# Patient Record
Sex: MALE | Race: BLACK OR AFRICAN AMERICAN | Employment: FULL TIME | URBAN - METROPOLITAN AREA
[De-identification: names, ages, dates, MRNs, and addresses within clinical notes are randomized per-mention and may not be internally consistent; named-entity substitution may affect disease eponyms.]

---

## 2017-12-12 ENCOUNTER — TELEPHONE (OUTPATIENT)
Dept: SURGERY | Age: 52
End: 2017-12-12

## 2017-12-12 NOTE — TELEPHONE ENCOUNTER
Pt does not have a phone number, left message with someone that answered the phone, possible young child, to call office, not sure she understood-Yris

## 2017-12-20 ENCOUNTER — OFFICE VISIT (OUTPATIENT)
Dept: SURGERY | Age: 52
End: 2017-12-20

## 2017-12-20 VITALS
WEIGHT: 310 LBS | DIASTOLIC BLOOD PRESSURE: 84 MMHG | TEMPERATURE: 98.4 F | SYSTOLIC BLOOD PRESSURE: 132 MMHG | HEIGHT: 77 IN | RESPIRATION RATE: 16 BRPM | BODY MASS INDEX: 36.6 KG/M2 | HEART RATE: 67 BPM

## 2017-12-20 DIAGNOSIS — E55.9 HYPOVITAMINOSIS D: ICD-10-CM

## 2017-12-20 DIAGNOSIS — Z98.84 GASTRIC BYPASS STATUS FOR OBESITY: ICD-10-CM

## 2017-12-20 DIAGNOSIS — K91.2 POST-RESECTION MALABSORPTION: Primary | ICD-10-CM

## 2017-12-20 NOTE — PROGRESS NOTES
Hollie Latham is a 46 y.o. male is now 2 years status post laparoscopic gastric bypass surgery. Doing well overall. Currently on a solid meats/veggies/CHO diet without difficulty. Taking in 80+oz water,  60+g protein. He walks before his shift as , but only a few minutes/day. Bowel movements are regular. The patient is not having any pain. Sandra Frank He is compliant with multivitamins, calcium, Vit D and B12 supplements. He is pleased with his weight stability overall, but he desires reaching her goal weight of 275-80 pounds      Weight Loss Metrics 12/20/2017 12/28/2016 6/29/2016 12/23/2015 12/23/2015 12/2/2015 11/18/2015   Pre op / Initial Wt - - - 432 - - 432   Today's Wt 310 lb 314 lb 328 lb - 376 lb 409 lb 2 oz -   BMI 36.76 kg/m2 37.23 kg/m2 38.89 kg/m2 - 44.58 kg/m2 48.51 kg/m2 -   Ideal Body Wt - - - 179 - - 179   Excess Body Wt - - - 253 - - 253   Goal Wt - - - 230 - - 230   Wt loss to date - - - 56 - - 0   % Wt Loss - - - 0.28 - - 0   80% EBW - - - 202.4 - - 202.4       Body mass index is 36.76 kg/(m^2).       Comorbidities:    Hypertension: unchanged  Diabetes: resolved  Obstructive Sleep Apnea: resolved  Hyperlipidemia: not applicable  Stress Urinary Incontinence: not applicable  Gastroesophageal Reflux: not applicable  Weight related arthropathy:not applicable        Past Medical History:   Diagnosis Date    Arthritis     Chronic pain     both legs    Diabetes ([address]     Headache     Hypertension     Sleep apnea     cpap    Wound of ankle     treatment of left ankle wound       Past Surgical History:   Procedure Laterality Date    HX ORTHOPAEDIC      left knee replacement left ankle reconstruction    LAP GASTR BYPASS INCL SMLL INT RECON  12/02/2015    Dr. Kaylynn Mckeon Bilateral 2013    vein stripping    VASCULAR SURGERY PROCEDURE UNLIST      left leg ankle vein 12-14-17       Current Outpatient Prescriptions   Medication Sig Dispense Refill  losartan (COZAAR) 100 mg tablet Take 100 mg by mouth daily.  Calcium-Cholecalciferol, D3, 600 mg(1,500mg) -400 unit chew Take  by mouth.  FERROUS SULFATE, DRIED (IRON, DRIED, PO) Take  by mouth.  multivitamin (ONE A DAY) tablet Take 1 Tab by mouth daily.  cyanocobalamin 500 mcg/spray spry by Nasal route.  allopurinol (ZYLOPRIM) 300 mg tablet Take 300 mg by mouth daily.  metoprolol (LOPRESSOR) 50 mg tablet Take 50 mg by mouth nightly.  amLODIPine (NORVASC) 10 mg tablet       ascorbic acid (VITAMIN C) 500 mg tablet Take  by mouth.  ferrous sulfate (IRON) 325 mg (65 mg iron) tablet Take  by mouth Daily (before breakfast).  cholecalciferol, vitamin D3, (VITAMIN D3) 2,000 unit tab Take  by mouth. No Known Allergies    ROS:  Review of Systems   All other systems reviewed and are negative. Physicial Exam:  Visit Vitals    /84    Pulse 67    Temp 98.4 °F (36.9 °C)    Resp 16    Ht 6' 5\" (1.956 m)    Wt 140.6 kg (310 lb)    BMI 36.76 kg/m2     Physical Exam   Constitutional: He is oriented to person, place, and time and well-developed, well-nourished, and in no distress. Pulmonary/Chest: Effort normal.   Abdominal: Soft. He exhibits no distension and no mass. There is no tenderness. There is no rebound and no guarding. Musculoskeletal: Normal range of motion. Neurological: He is alert and oriented to person, place, and time. Gait normal.   Skin: Skin is warm and dry. No rash noted. No erythema. No pallor. Psychiatric: Mood, memory, affect and judgment normal.       Labs: 12/6/17: wnl    Assessment/Plan: Pt is currently 2 years s/p laparoscopic gastric bypass surgery with a total weight loss of 120lbs to date, doing well overall. If desires further weight loss, advised patient eliminate carbohydrates from his diet.   Labs were reviewed and pt was instructed to cont all supplements  Stressed importance of hydration with SF clear liquids until urine clear. Change diet to food content mainly meats/veggies. Encouraged support group attendance-gave 2018 schedule  Advised exercise program of 150 minutes/wk as able  Counseling>50% of 30 minute visit    F/u 1 year, labs prior, sooner as needed.   [facility], [place]

## 2017-12-27 DIAGNOSIS — K91.2 POSTSURGICAL MALABSORPTION: ICD-10-CM

## 2018-01-30 RX ORDER — CYANOCOBALAMIN 500 UG/1
SPRAY NASAL
Qty: 1.3 ML | Refills: 3 | Status: SHIPPED | OUTPATIENT
Start: 2018-01-30 | End: 2018-12-20 | Stop reason: SDUPTHER

## 2018-11-15 DIAGNOSIS — K91.2 POSTOPERATIVE MALABSORPTION: Primary | ICD-10-CM

## 2018-12-20 ENCOUNTER — OFFICE VISIT (OUTPATIENT)
Dept: SURGERY | Age: 53
End: 2018-12-20

## 2018-12-20 VITALS
DIASTOLIC BLOOD PRESSURE: 94 MMHG | SYSTOLIC BLOOD PRESSURE: 145 MMHG | HEART RATE: 60 BPM | WEIGHT: 315 LBS | HEIGHT: 77 IN | OXYGEN SATURATION: 98 % | RESPIRATION RATE: 16 BRPM | TEMPERATURE: 98.2 F | BODY MASS INDEX: 37.19 KG/M2

## 2018-12-20 DIAGNOSIS — K91.2 POST-RESECTION MALABSORPTION: ICD-10-CM

## 2018-12-20 DIAGNOSIS — Z98.84 GASTRIC BYPASS STATUS FOR OBESITY: ICD-10-CM

## 2018-12-20 DIAGNOSIS — E66.9 OBESITY (BMI 30-39.9): Primary | ICD-10-CM

## 2018-12-20 NOTE — PROGRESS NOTES
Arianna Sanches is a 48 y.o. male is now 3 years status post laparoscopic gastric bypass surgery. Doing well overall. Currently on a stage 4 diet without difficulty. Eating rice, potatoes, chips. Taking in 64 oz water,  60g protein. 15-20 min of activity 5 days a week. Bowel movements are alternating constipation and regularity. The patient is not having any pain. Eliseo Counter He is compliant with multivitamins, calcium, Vit D and B12 supplements. Also takes iron. Would like to get to 300lbs. Weight Loss Metrics 12/20/2018 12/20/2018 12/20/2017 12/28/2016 6/29/2016 12/23/2015 12/23/2015   Pre op / Initial Wt 432 - - - - 432 -   Today's Wt - 324 lb 310 lb 314 lb 328 lb - 376 lb   BMI - 38.42 kg/m2 36.76 kg/m2 37.23 kg/m2 38.89 kg/m2 - 44.58 kg/m2   Ideal Body Wt 183 - - - - 179 -   Excess Body Wt 249 - - - - 253 -   Goal Wt 233 - - - - 230 -   Wt loss to date 108 - - - - 56 -   % Wt Loss 0.54 - - - - 0.28 -   80% .2 - - - - 202.4 -       Body mass index is 38.42 kg/m².       Comorbidities:  Hypertension: unchanged  Diabetes: resolved  Obstructive Sleep Apnea: resolved  Hyperlipidemia: not applicable  Stress Urinary Incontinence: not applicable  Gastroesophageal Reflux: not applicable  Weight related arthropathy:not applicable      Past Medical History:   Diagnosis Date    Arthritis     Chronic pain     both legs    Diabetes ([address]     Headache     Hypertension     Sleep apnea     cpap    Wound of ankle     treatment of left ankle wound       Past Surgical History:   Procedure Laterality Date    HX ORTHOPAEDIC      left knee replacement left ankle reconstruction    LAP GASTR BYPASS INCL SMLL INT RECON  12/02/2015    Dr. Pietro Leach Bilateral 2013    vein stripping    VASCULAR SURGERY PROCEDURE UNLIST      left leg ankle vein 12-14-17       Current Outpatient Medications   Medication Sig Dispense Refill    NASCOBAL 500 mcg/spray spry USE 1 SPRAY IN ONE NOSTRIL ONCE A WEEK AS DIRECTED 1.3 mL 3    losartan (COZAAR) 100 mg tablet Take 100 mg by mouth daily.  Calcium-Cholecalciferol, D3, 600 mg(1,500mg) -400 unit chew Take  by mouth.  FERROUS SULFATE, DRIED (IRON, DRIED, PO) Take  by mouth.  multivitamin (ONE A DAY) tablet Take 1 Tab by mouth daily.  allopurinol (ZYLOPRIM) 300 mg tablet Take 300 mg by mouth daily.  metoprolol (LOPRESSOR) 50 mg tablet Take 50 mg by mouth nightly.  amLODIPine (NORVASC) 10 mg tablet       ascorbic acid (VITAMIN C) 500 mg tablet Take  by mouth.  ferrous sulfate (IRON) 325 mg (65 mg iron) tablet Take  by mouth Daily (before breakfast).  cholecalciferol, vitamin D3, (VITAMIN D3) 2,000 unit tab Take  by mouth. No Known Allergies    ROS:  Review of Systems   Respiratory:        Nasal congestion   All other systems reviewed and are negative. Physicial Exam:  Visit Vitals  BP (!) 145/94   Pulse 60   Temp 98.2 °F (36.8 °C) (Oral)   Resp 16   Ht 6' 5\" (1.956 m)   Wt 147 kg (324 lb)   SpO2 98%   BMI 38.42 kg/m²     Physical Exam   Constitutional: He is oriented to person, place, and time and well-developed, well-nourished, and in no distress. HENT:   Head: Normocephalic. Cardiovascular: Normal rate. Pulmonary/Chest: Effort normal.   Abdominal: Soft. Bowel sounds are normal.   Musculoskeletal: Normal range of motion. Neurological: He is alert and oriented to person, place, and time. Skin: Skin is warm and dry. Psychiatric: Affect normal.       Labs: pending   Assessment/Plan: Pt is currently 3 yr s/p laparoscopic gastric bypass surgery with a total weight loss of 108 lbs to date, doing well but desires to lose more weight. If desires further weight loss, advised patient eliminate carbohydrates from his diet. Ref to RD. Labs pending. Stressed importance of hydration with SF clear liquids until urine clear. Continue with food content mainly meats/veggies. Food list given. Encouraged support group attendance. Advised exercise program of 150m/week.         >50% of 30 min visit spent counseling     Shanice Mosher, FNP-BC

## 2018-12-20 NOTE — PROGRESS NOTES
Pt ID confirmed    Weight Loss Metrics 12/20/2018 12/20/2018 12/20/2017 12/28/2016 6/29/2016 12/23/2015 12/23/2015   Pre op / Initial Wt 432 - - - - 432 -   Today's Wt - 324 lb 310 lb 314 lb 328 lb - 376 lb   BMI - 38.42 kg/m2 36.76 kg/m2 37.23 kg/m2 38.89 kg/m2 - 44.58 kg/m2   Ideal Body Wt 183 - - - - 179 -   Excess Body Wt 249 - - - - 253 -   Goal Wt 233 - - - - 230 -   Wt loss to date 108 - - - - 56 -   % Wt Loss 0.54 - - - - 0.28 -   80% .2 - - - - 202.4 -       Body mass index is 38.42 kg/m². Post op medications:  MVI: x2day  Calcium Citrate: 1500mg  Actigal na  B-12 1000microgram  Vit D 3 5000units    Chief Complaint   Patient presents with    Follow-up     GBP year follow up     1. Have you been to the ER, urgent care clinic since your last visit? Hospitalized since your last visit? No    2. Have you seen or consulted any other health care providers outside of the [address] [place] [address] since your last visit? Include any pap smears or colon screening. No     [place] Emelyn has been given the following recommendations today due to his elevated BP reading: please follow up with pcp regarding elevated bp.

## 2019-12-13 ENCOUNTER — TELEPHONE (OUTPATIENT)
Dept: SURGERY | Age: 54
End: 2019-12-13

## 2019-12-13 DIAGNOSIS — K91.2 POST-RESECTION MALABSORPTION: Primary | ICD-10-CM

## 2019-12-13 NOTE — TELEPHONE ENCOUNTER
Called pt for reminder of upcoming appointment and to get labs. Pt stated he would need to reschedule due to a class he was taking and to please fax lab orders to [facility] where he gets his labs done. Labs faxed to [facility] (-) Lab at fax # [phone]. Pt rescheduled appointment for 12/26/2019 at 0930.

## 2019-12-26 ENCOUNTER — OFFICE VISIT (OUTPATIENT)
Dept: SURGERY | Age: 54
End: 2019-12-26

## 2019-12-26 VITALS
TEMPERATURE: 97.9 F | DIASTOLIC BLOOD PRESSURE: 80 MMHG | HEART RATE: 80 BPM | HEIGHT: 77 IN | SYSTOLIC BLOOD PRESSURE: 128 MMHG | OXYGEN SATURATION: 98 % | WEIGHT: 315 LBS | RESPIRATION RATE: 16 BRPM | BODY MASS INDEX: 37.19 KG/M2

## 2019-12-26 DIAGNOSIS — I10 ESSENTIAL HYPERTENSION: ICD-10-CM

## 2019-12-26 DIAGNOSIS — Z98.84 HISTORY OF ROUX-EN-Y GASTRIC BYPASS: ICD-10-CM

## 2019-12-26 DIAGNOSIS — K91.2 POST-RESECTION MALABSORPTION: Primary | ICD-10-CM

## 2019-12-26 NOTE — PROGRESS NOTES
Florentino Ortiz is a 47 y.o. male is now 4 years status post laparoscopic gastric bypass surgery. Doing well overall. Currently on a bariatric stage 5 diet without difficulty. Taking in > 64 oz water, > 30 g protein. 15 min of activity 2-3 days a week. Bowel movements are alternating constipation and regularity. The patient is not having any pain. Melvenia Dmitriy He is compliant with multivitamins, calcium, Vit D and B1 & 12 supplements. Patient denies use of NSAIDs, ETOH, or use of straws. Patient does reports he drinks sodas twice a week and sometimes sweet tea. Weight Loss Metrics 12/26/2019 12/26/2019 12/20/2018 12/20/2018 12/20/2017 12/28/2016 6/29/2016   Pre op / Initial Wt 432 - 432 - - - -   Today's Wt - 325 lb - 324 lb 310 lb 314 lb 328 lb   BMI - 38.54 kg/m2 - 38.42 kg/m2 36.76 kg/m2 37.23 kg/m2 38.89 kg/m2   Ideal Body Wt 183 - 183 - - - -   Excess Body Wt 249 - 249 - - - -   Goal Wt 233 - 233 - - - -   Wt loss to date - - 108 - - - -   % Wt Loss - - 0.54 - - - -   80% .2 - 199.2 - - - -       Body mass index is 38.54 kg/m².       Comorbidities:    Hypertension: unchanged  Diabetes: not applicable  Obstructive Sleep Apnea: unchanged  Hyperlipidemia: not applicable  Stress Urinary Incontinence: not applicable  Gastroesophageal Reflux: not applicable  Weight related arthropathy:improved        Past Medical History:   Diagnosis Date    Arthritis     Chronic pain     both legs    Diabetes ([address]     Headache     Hypertension     Sleep apnea     cpap    Wound of ankle     treatment of left ankle wound       Past Surgical History:   Procedure Laterality Date    HX ORTHOPAEDIC      left knee replacement left ankle reconstruction    LAP GASTR BYPASS INCL SMLL INT RECON  12/02/2015    Dr. Fabian Shannon Bilateral 2013    vein stripping    VASCULAR SURGERY PROCEDURE UNLIST      left leg ankle vein 12-14-17       Current Outpatient Medications   Medication Sig Dispense Refill    cyanocobalamin (NASCOBAL) 500 mcg/spray spry 500 mcg by Nasal route every seven (7) days. Use one spray in one nostril once a week as directed 2.3 mL 5    cyanocobalamin (NASCOBAL) 500 mcg/spray spry 1 [place] by Nasal route every seven (7) days. 5.2 mL 3    losartan (COZAAR) 100 mg tablet Take 100 mg by mouth daily.  Calcium-Cholecalciferol, D3, 600 mg(1,500mg) -400 unit chew Take  by mouth.  FERROUS SULFATE, DRIED (IRON, DRIED, PO) Take  by mouth.  multivitamin (ONE A DAY) tablet Take 1 Tab by mouth daily.  allopurinol (ZYLOPRIM) 300 mg tablet Take 300 mg by mouth daily.  metoprolol (LOPRESSOR) 50 mg tablet Take 50 mg by mouth nightly.  amLODIPine (NORVASC) 10 mg tablet       ascorbic acid (VITAMIN C) 500 mg tablet Take  by mouth.  ferrous sulfate (IRON) 325 mg (65 mg iron) tablet Take  by mouth Daily (before breakfast).  cholecalciferol, vitamin D3, (VITAMIN D3) 2,000 unit tab Take  by mouth.          No Known Allergies    ROS:  Positive in BOLD  CONST: Fever, weight loss, fatigue or chills  GI: Nausea, vomiting, abdominal pain, change in bowel habits, hematochezia, melena, and GERD   INTEG: Dermatitis, abnormal moles  HEENT: Recent changes in vision, vertigo, epistaxis, dysphagia and hoarseness  CV: Chest pain, palpitations, HTN, edema and varicosities  RESP: Cough, shortness of breath, wheezing, hemoptysis, snoring and reactive airway disease  : Hematuria, dysuria, frequency, urgency, nocturia and stress urinary incontinence   MS: Weakness, joint pain and arthritis  ENDO: Diabetes, thyroid disease, polyuria, polydipsia, polyphagia, poor wound healing, heat intolerance, cold intolerance  LYMPH/HEME: Anemia, bruising and history of blood transfusions  NEURO: Dizziness, headache, fainting, seizures and stroke  PSYCH: Anxiety and depression      Physicial Exam:  Visit Vitals  /80   Pulse 80   Temp 97.9 °F (36.6 °C) (Oral)   Resp 16   Ht 6' 5\" (1.956 m)   Wt 147.4 kg (325 lb)   SpO2 98%   BMI 38.54 kg/m²     General: 47 y.o.) male in no acute distress. HEENT: Normocephalic, atraumatic, Pupils equal and reactive, nasopharynx clear, oropharynx clear and moist without lesions  NECK: Supple, no lymphadenopathy, thyromegaly, carotid bruits or jugular venous distension. trachea midline  RESP: Clear to auscultation bilaterally, no wheezes, rhonchi, or rales, normal respiratory excursion  CV: Regular rate and rhythm, no murmurs, rubs or gallops. 3+/4 pulses in bilateral dorsalis pedis and posterior tibialis. No distal edema or varicosities. ABD: Soft, nontender, nondistended, normoactive bowel sounds, no hernias, no hepatosplenomegaly  Extremities: Warm, well perfused, no tenderness or swelling, normal gait/station  Neuro: Sensation and strength grossly intact and symmetrical  Psych: Alert and oriented to person, place, and time. Labs: Reviewed     Assessment/Plan: Pt is currently 4 years s/p laparoscopic gastric bypass surgery with a total weight loss of 107 lbs to date, doing well  Labs were reviewed with patient. Patient was instructed to continue multivitamins, calcium, Vit D and B1 & 12 supplements  Stressed importance of hydration with SF clear liquids until urine clear. OK to advance diet as directed in bariatric manual with food content mainly meats/veggies. Encouraged support group attendance  Advised exercise program of 30 minutes daily   Strongly advised patient to stop drinking sodas and sweet tea           >50% of 25 minute visit spent face to face time with Trevin Reece consisted of counseling & coordinating and/or discussing treatment plans in reference to his The encounter diagnosis was Post-resection malabsorption.         Saba Bragg, HAYLEEP-BC

## 2019-12-26 NOTE — PROGRESS NOTES
Chief Complaint   Patient presents with    Follow-up     [facility] 12/2015     Pt ID confirmed    Weight Loss Metrics 12/26/2019 12/26/2019 12/20/2018 12/20/2018 12/20/2017 12/28/2016 6/29/2016   Pre op / Initial Wt 432 - 432 - - - -   Today's Wt - 325 lb - 324 lb 310 lb 314 lb 328 lb   BMI - 38.54 kg/m2 - 38.42 kg/m2 36.76 kg/m2 37.23 kg/m2 38.89 kg/m2   Ideal Body Wt 183 - 183 - - - -   Excess Body Wt 249 - 249 - - - -   Goal Wt - - 233 - - - -   Wt loss to date - - 108 - - - -   % Wt Loss - - 0.54 - - - -   80% .2 - 199.2 - - - -       Body mass index is 38.54 kg/m².     Post op medications:  MVI: x2day  Calcium Citrate: 1500mg    B-12 1000mcg  Vit D 3 5000units

## 2020-01-09 ENCOUNTER — TELEPHONE (OUTPATIENT)
Dept: SURGERY | Age: 55
End: 2020-01-09

## 2020-01-09 NOTE — TELEPHONE ENCOUNTER
Attempted to call patient per NP Carmenza Johansen regarding lab results. Left message for patient to return call. Also called mobile number and the mailbox was full.

## 2020-12-11 DIAGNOSIS — K91.2 POSTOPERATIVE MALABSORPTION: Primary | ICD-10-CM

## 2020-12-18 ENCOUNTER — OFFICE VISIT (OUTPATIENT)
Dept: SURGERY | Age: 55
End: 2020-12-18
Payer: COMMERCIAL

## 2020-12-18 VITALS
HEART RATE: 63 BPM | RESPIRATION RATE: 20 BRPM | OXYGEN SATURATION: 98 % | SYSTOLIC BLOOD PRESSURE: 160 MMHG | TEMPERATURE: 97.7 F | HEIGHT: 77 IN | WEIGHT: 315 LBS | BODY MASS INDEX: 37.19 KG/M2 | DIASTOLIC BLOOD PRESSURE: 90 MMHG

## 2020-12-18 DIAGNOSIS — R63.5 WEIGHT GAIN STATUS POST GASTRIC BYPASS: ICD-10-CM

## 2020-12-18 DIAGNOSIS — Z98.84 HISTORY OF ROUX-EN-Y GASTRIC BYPASS: Primary | ICD-10-CM

## 2020-12-18 DIAGNOSIS — K91.2 POST-RESECTION MALABSORPTION: ICD-10-CM

## 2020-12-18 DIAGNOSIS — Z98.84 WEIGHT GAIN STATUS POST GASTRIC BYPASS: ICD-10-CM

## 2020-12-18 DIAGNOSIS — Z98.84 GASTRIC BYPASS STATUS FOR OBESITY: ICD-10-CM

## 2020-12-18 PROCEDURE — 99214 OFFICE O/P EST MOD 30 MIN: CPT | Performed by: NURSE PRACTITIONER

## 2020-12-18 RX ORDER — HYDROCHLOROTHIAZIDE 25 MG/1
25 TABLET ORAL DAILY
COMMUNITY
Start: 2020-09-21

## 2020-12-18 RX ORDER — ERGOCALCIFEROL 1.25 MG/1
50000 CAPSULE ORAL
COMMUNITY

## 2020-12-18 RX ORDER — PENTOXIFYLLINE 400 MG/1
400 TABLET, EXTENDED RELEASE ORAL
COMMUNITY
Start: 2020-12-10 | End: 2021-12-22

## 2020-12-18 RX ORDER — CYANOCOBALAMIN 500 UG/1
1 SPRAY NASAL
Qty: 5.2 ML | Refills: 12 | Status: SHIPPED | OUTPATIENT
Start: 2020-12-18 | End: 2022-01-10 | Stop reason: SDUPTHER

## 2020-12-18 NOTE — PROGRESS NOTES
Pt ID confirmed    Weight Loss Metrics 12/18/2020 12/18/2020 12/26/2019 12/26/2019 12/20/2018 12/20/2018 12/20/2017   Pre op / Initial Wt 432 - 432 - 432 - -   Today's Wt - 344 lb 8 oz - 325 lb - 324 lb 310 lb   BMI - 40.85 kg/m2 - 38.54 kg/m2 - 38.42 kg/m2 36.76 kg/m2   Ideal Body Wt 183 - 183 - 183 - -   Excess Body Wt 249 - 249 - 249 - -   Goal Wt 233 - 233 - 233 - -   Wt loss to date 87.5 - - - 108 - -   % Wt Loss 0.44 - - - 0.54 - -   80% .2 - 199.2 - 199.2 - -       Body mass index is 40.85 kg/m².     Post op medications:  MVI: bid  Calcium Citrate: 1500  Mg daily  Actigal: 0  B-12: need refill of Nascobal  Vitamin D3: 5,000 units daily

## 2020-12-18 NOTE — PROGRESS NOTES
3       Bariatric Postoperative Progress Note    Gm Muro is a 54 y.o. male is now 5 years status post laparoscopic gastric bypass surgery. Currently on a stage 4 diet without difficulty. Taking in 64oz water,  60g protein. 0min of activity 7 days a week. Bowel movements are regular. The patient is not having any pain. Rawleigh Billing He is compliant with multivitamins, calcium, Vit D and B12 supplements. Out of B12 recently, requesting refills today. Weight Loss Metrics 12/18/2020 12/18/2020 12/26/2019 12/26/2019 12/20/2018 12/20/2018 12/20/2017   Pre op / Initial Wt 432 - 432 - 432 - -   Today's Wt - 344 lb 8 oz - 325 lb - 324 lb 310 lb   BMI - 40.85 kg/m2 - 38.54 kg/m2 - 38.42 kg/m2 36.76 kg/m2   Ideal Body Wt 183 - 183 - 183 - -   Excess Body Wt 249 - 249 - 249 - -   Goal Wt 233 - 233 - 233 - -   Wt loss to date 87.5 - - - 108 - -   % Wt Loss 0.44 - - - 0.54 - -   80% .2 - 199.2 - 199.2 - -     Comorbidities:  Hypertension: unchanged  Diabetes: resolved  Obstructive Sleep Apnea: resolved  Hyperlipidemia: not applicable  Stress Urinary Incontinence: not applicable  Gastroesophageal Reflux: not applicable  Weight related arthropathy:not applicable        Past Medical History:   Diagnosis Date    Arthritis     Chronic pain     both legs    Diabetes ([address]     Headache     Hypertension     Sleep apnea     cpap    Wound of ankle     treatment of left ankle wound       Past Surgical History:   Procedure Laterality Date    HX ORTHOPAEDIC      left knee replacement left ankle reconstruction    LAP GASTR BYPASS INCL SMLL INT RECON  12/02/2015    Dr. Persaud Primer Bilateral 2013    vein stripping    VASCULAR SURGERY PROCEDURE UNLIST      left leg ankle vein 12-14-17       Current Outpatient Medications   Medication Sig Dispense Refill    ergocalciferol (ERGOCALCIFEROL) 1,250 mcg (50,000 unit) capsule Take 50,000 Units by mouth.       pentoxifylline CR (TRENTAL) 400 mg CR tablet 400 mg three (3) times daily (with meals).  hydroCHLOROthiazide (HYDRODIURIL) 25 mg tablet 25 mg daily.  cyanocobalamin (NASCOBAL) 500 mcg/spray spry 500 mcg by Nasal route every seven (7) days. Use one spray in one nostril once a week as directed 2.3 mL 5    cyanocobalamin (NASCOBAL) 500 mcg/spray spry 1 [place] by Nasal route every seven (7) days. 5.2 mL 3    losartan (COZAAR) 100 mg tablet Take 100 mg by mouth daily. 1/2 tab daily      Calcium-Cholecalciferol, D3, 600 mg(1,500mg) -400 unit chew Take  by mouth.  FERROUS SULFATE, DRIED (IRON, DRIED, PO) Take  by mouth.  multivitamin (ONE A DAY) tablet Take 1 Tab by mouth daily.  allopurinol (ZYLOPRIM) 300 mg tablet Take 300 mg by mouth daily.  metoprolol (LOPRESSOR) 50 mg tablet Take 50 mg by mouth nightly.  amLODIPine (NORVASC) 10 mg tablet       ascorbic acid (VITAMIN C) 500 mg tablet Take  by mouth.  ferrous sulfate (IRON) 325 mg (65 mg iron) tablet Take  by mouth Daily (before breakfast).  cholecalciferol, vitamin D3, (VITAMIN D3) 2,000 unit tab Take  by mouth. No Known Allergies    ROS:  Review of Systems   Constitutional:        Weight gain    All other systems reviewed and are negative. Physicial Exam:  Visit Vitals  BP (!) 160/90   Pulse 63   Temp 97.7 °F (36.5 °C) (Temporal)   Resp 20   Ht 6' 5\" (1.956 m)   Wt 156.3 kg (344 lb 8 oz)   SpO2 98%   BMI 40.85 kg/m²     Physical Exam  Vitals signs and nursing note reviewed. HENT:      Head: Normocephalic and atraumatic. Cardiovascular:      Rate and Rhythm: Normal rate. Heart sounds: Normal heart sounds. Pulmonary:      Effort: Pulmonary effort is normal.      Breath sounds: Normal breath sounds. Abdominal:      General: Bowel sounds are normal. There is no distension. Palpations: Abdomen is soft. Tenderness: There is no abdominal tenderness. Musculoskeletal: Normal range of motion.    Skin: General: Skin is warm and dry. Neurological:      General: No focal deficit present. Mental Status: He is alert. Labs:   No results found for this or any previous visit (from the past 672 hour(s)). Assessment/Plan: Today, Maryjane Chavarria is  Height: 6' 5\" (195.6 cm) tall, Weight: 156.3 kg (344 lb 8 oz) lbs for a Body mass index is 40.85 kg/m². and are suffering from morbid obesity . They are currently 5 years s/p laparoscopic gastric bypass surgery with a total weight loss of 87.5lbs to date, doing well but gaining weight. Labs were ordered and pt was instructed to have preformed. We have reviewed the components of a successful postoperative course including requirement for a high protein, low carbohydrate diet, 64 oz a day of zero calorie liquids, daily vitamin supplementation, daily exercise (150 mis/week), regular follow-up, and participation in support groups. Refer to registered dietitian for more detailed medical nutrition therapy. The primary encounter diagnosis was History of Tila-en-Y gastric bypass. Diagnoses of Post-resection malabsorption, Gastric bypass status for obesity, Weight gain status post gastric bypass, and BMI 40.0-44.9, adult ([address] were also pertinent to this visit. Follow up RD, weight check and yearly with labs, sooner as needed.   Vidal Joseph NP

## 2021-01-12 ENCOUNTER — DOCUMENTATION ONLY (OUTPATIENT)
Dept: OTHER | Age: 56
End: 2021-01-12

## 2021-01-12 NOTE — PROGRESS NOTES
1/12/2021: I have attempted to call patient to schedule a nutrition follow up per Kayla Fernandez, but his mail box is full.     Sueann Skiff, MS RD

## 2021-01-13 ENCOUNTER — DOCUMENTATION ONLY (OUTPATIENT)
Dept: BARIATRICS/WEIGHT MGMT | Age: 56
End: 2021-01-13

## 2021-01-13 NOTE — PROGRESS NOTES
1/13/2021:  Patient was contacted to schedule a nutrition follow up. 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     Yany Everett MS RD

## 2021-01-14 ENCOUNTER — TELEPHONE (OUTPATIENT)
Dept: SURGERY | Age: 56
End: 2021-01-14

## 2021-01-14 NOTE — TELEPHONE ENCOUNTER
PT called and and stated his pharmacy has not received the electronic script for his B-12 spray from his mail order pharmacy that was ordered at his last appt on 12/18/2020. I looked in his record and it had been ordered. I called his pharmacy and they stated they did not receive it but did send out a request for a refill. The pharmacy took a verbal over the phone which was given exactly how it was ordered by the provider on 12/18/2020. It will be processed and sent to patient. Pt was notified that script would be shipped as soon as processed.

## 2021-05-31 ENCOUNTER — HOSPITAL ENCOUNTER (EMERGENCY)
Age: 56
Discharge: HOME | End: 2021-05-31
Attending: INTERNAL MEDICINE
Payer: COMMERCIAL

## 2021-05-31 VITALS
TEMPERATURE: 98.3 F | HEART RATE: 85 BPM | DIASTOLIC BLOOD PRESSURE: 80 MMHG | SYSTOLIC BLOOD PRESSURE: 125 MMHG | BODY MASS INDEX: 36.6 KG/M2 | HEIGHT: 77 IN | WEIGHT: 310 LBS | OXYGEN SATURATION: 97 % | RESPIRATION RATE: 18 BRPM

## 2021-05-31 VITALS
BODY MASS INDEX: 36.6 KG/M2 | HEART RATE: 70 BPM | OXYGEN SATURATION: 98 % | SYSTOLIC BLOOD PRESSURE: 182 MMHG | TEMPERATURE: 98.5 F | WEIGHT: 310 LBS | DIASTOLIC BLOOD PRESSURE: 98 MMHG | HEIGHT: 77 IN | RESPIRATION RATE: 18 BRPM

## 2021-05-31 DIAGNOSIS — R04.0 ANTERIOR EPISTAXIS: Primary | ICD-10-CM

## 2021-05-31 DIAGNOSIS — R04.0 RIGHT-SIDED EPISTAXIS: Primary | ICD-10-CM

## 2021-05-31 DIAGNOSIS — I10 ESSENTIAL HYPERTENSION: ICD-10-CM

## 2021-05-31 LAB
ANION GAP SERPL CALC-SCNC: 8 MMOL/L
BASOPHILS # BLD: 0 K/UL (ref 0–0.1)
BASOPHILS NFR BLD: 1 % (ref 0–2)
BUN SERPL-MCNC: 24 MG/DL (ref 9–21)
BUN/CREAT SERPL: 24
CA-I BLD-MCNC: 8.7 MG/DL (ref 8.5–10.5)
CHLORIDE SERPL-SCNC: 106 MMOL/L (ref 94–111)
CO2 SERPL-SCNC: 25 MMOL/L (ref 21–33)
CREAT SERPL-MCNC: 1 MG/DL (ref 0.8–1.5)
EOSINOPHIL # BLD: 0.1 K/UL (ref 0–0.4)
EOSINOPHIL NFR BLD: 2 % (ref 0–5)
ERYTHROCYTE [DISTWIDTH] IN BLOOD BY AUTOMATED COUNT: 15.8 % (ref 11.6–14.5)
GLUCOSE SERPL-MCNC: 136 MG/DL (ref 70–110)
HCT VFR BLD AUTO: 33.5 % (ref 36–48)
HGB BLD-MCNC: 10.5 G/DL (ref 13–16)
IMM GRANULOCYTES # BLD AUTO: 0 K/UL
IMM GRANULOCYTES NFR BLD AUTO: 0 %
INR PPP: 1.1 (ref 0.8–1.2)
LYMPHOCYTES # BLD: 0.9 K/UL (ref 0.9–3.6)
LYMPHOCYTES NFR BLD: 23 % (ref 21–52)
MCH RBC QN AUTO: 27.9 PG (ref 24–34)
MCHC RBC AUTO-ENTMCNC: 31.3 G/DL (ref 31–37)
MCV RBC AUTO: 88.9 FL (ref 74–97)
MONOCYTES # BLD: 0.3 K/UL (ref 0.05–1.2)
MONOCYTES NFR BLD: 8 % (ref 3–10)
NEUTS SEG # BLD: 2.6 K/UL (ref 1.8–8)
NEUTS SEG NFR BLD: 66 % (ref 40–73)
PLATELET # BLD AUTO: 234 K/UL (ref 135–420)
PMV BLD AUTO: 10 FL (ref 9.2–11.8)
POTASSIUM SERPL-SCNC: 3.4 MMOL/L (ref 3.2–5.1)
PROTHROMBIN TIME: 13.2 SEC (ref 11.5–15.2)
RBC # BLD AUTO: 3.77 M/UL (ref 4.7–5.5)
SODIUM SERPL-SCNC: 139 MMOL/L (ref 135–145)
WBC # BLD AUTO: 4 K/UL (ref 4.6–13.2)

## 2021-05-31 PROCEDURE — 99285 EMERGENCY DEPT VISIT HI MDM: CPT

## 2021-05-31 PROCEDURE — 80048 BASIC METABOLIC PNL TOTAL CA: CPT

## 2021-05-31 PROCEDURE — 74011250637 HC RX REV CODE- 250/637: Performed by: INTERNAL MEDICINE

## 2021-05-31 PROCEDURE — 99283 EMERGENCY DEPT VISIT LOW MDM: CPT

## 2021-05-31 PROCEDURE — 74011000250 HC RX REV CODE- 250: Performed by: INTERNAL MEDICINE

## 2021-05-31 PROCEDURE — 85025 COMPLETE CBC W/AUTO DIFF WBC: CPT

## 2021-05-31 PROCEDURE — 36415 COLL VENOUS BLD VENIPUNCTURE: CPT

## 2021-05-31 PROCEDURE — 85610 PROTHROMBIN TIME: CPT

## 2021-05-31 PROCEDURE — 74011250636 HC RX REV CODE- 250/636: Performed by: INTERNAL MEDICINE

## 2021-05-31 RX ORDER — CEPHALEXIN 500 MG/1
500 CAPSULE ORAL 2 TIMES DAILY
Qty: 6 CAPSULE | Refills: 0 | Status: SHIPPED | OUTPATIENT
Start: 2021-05-31 | End: 2021-06-03

## 2021-05-31 RX ORDER — OXYMETAZOLINE HCL 0.05 %
2 SPRAY, NON-AEROSOL (ML) NASAL
Status: COMPLETED | OUTPATIENT
Start: 2021-05-31 | End: 2021-05-31

## 2021-05-31 RX ORDER — SILVER NITRATE 38.21; 12.74 MG/1; MG/1
1 STICK TOPICAL
Status: DISCONTINUED | OUTPATIENT
Start: 2021-05-31 | End: 2021-05-31

## 2021-05-31 RX ORDER — HYDRALAZINE HYDROCHLORIDE 20 MG/ML
5 INJECTION INTRAMUSCULAR; INTRAVENOUS ONCE
Status: DISCONTINUED | OUTPATIENT
Start: 2021-05-31 | End: 2021-05-31

## 2021-05-31 RX ADMIN — SODIUM CHLORIDE 1000 ML: 9 INJECTION, SOLUTION INTRAVENOUS at 02:58

## 2021-05-31 RX ADMIN — OXYMETAZOLINE HCL 2 SPRAY: 0.05 SPRAY NASAL at 01:40

## 2021-05-31 RX ADMIN — BENZOCAINE: 200 SPRAY DENTAL; ORAL; PERIODONTAL at 01:40

## 2021-05-31 RX ADMIN — OXYMETAZOLINE HCL 2 SPRAY: 0.05 SPRAY NASAL at 15:53

## 2021-05-31 NOTE — ED NOTES
Dr. Gisella Glass in to place Epistax packing to Right nostril to control bleeding. Will continue to monitor.

## 2021-05-31 NOTE — ED PROVIDER NOTES
EMERGENCY DEPARTMENT HISTORY AND PHYSICAL EXAM      Date: 5/31/2021  Patient Name: Devon Covington    History of Presenting Illness     Chief Complaint   Patient presents with    Epistaxis       History Provided By: Patient    HPI: Devon Covington, 64 y.o. male with a past medical history significant diabetes, hypertension and chronic left ankle wound, obstructive sleep apnea presents to the ED with cc of right sided epistaxis. This has been going on intermittently since earlier today. He went to [facility] ER last evening, and since the bleeding had stopped no intervention was required. After going home he started to have brisk bleeding from the right nare. Ice and pressure were applied continuously for at least 10 minutes but this did not stop the bleeding. Patient denies picking, trauma, steroid or other nasal sprays, snorting cocaine, or any other potential aggravators. He is not on anticoagulation or NSAIDs. There are no other complaints, changes, or physical findings at this time. PCP: Angela Becerra MD    No current facility-administered medications on file prior to encounter. Current Outpatient Medications on File Prior to Encounter   Medication Sig Dispense Refill    oxymetazoline HCl (AFRIN NASAL SPRAY NA) 2 Sprays by Nasal route two (2) times daily as needed (for nose bleeds).  ergocalciferol (ERGOCALCIFEROL) 1,250 mcg (50,000 unit) capsule Take 50,000 Units by mouth.  pentoxifylline CR (TRENTAL) 400 mg CR tablet 400 mg three (3) times daily (with meals).  hydroCHLOROthiazide (HYDRODIURIL) 25 mg tablet 25 mg daily.  cyanocobalamin (Nascobal) 500 mcg/spray spry 1 [place] by Nasal route every seven (7) days. Indications: prevention of vitamin B12 deficiency 5.2 mL 12    cyanocobalamin (NASCOBAL) 500 mcg/spray spry 500 mcg by Nasal route every seven (7) days.  Use one spray in one nostril once a week as directed 2.3 mL 5    losartan (COZAAR) 100 mg tablet Take 100 mg by mouth daily. 1/2 tab daily      Calcium-Cholecalciferol, D3, 600 mg(1,500mg) -400 unit chew Take  by mouth.  FERROUS SULFATE, DRIED (IRON, DRIED, PO) Take  by mouth.  multivitamin (ONE A DAY) tablet Take 1 Tab by mouth daily.  allopurinol (ZYLOPRIM) 300 mg tablet Take 300 mg by mouth daily.  metoprolol (LOPRESSOR) 50 mg tablet Take 50 mg by mouth nightly.  amLODIPine (NORVASC) 10 mg tablet       ascorbic acid (VITAMIN C) 500 mg tablet Take  by mouth.  ferrous sulfate (IRON) 325 mg (65 mg iron) tablet Take  by mouth Daily (before breakfast).  cholecalciferol, vitamin D3, (VITAMIN D3) 2,000 unit tab Take  by mouth. Past History     Past Medical History:  Past Medical History:   Diagnosis Date    Arthritis     Chronic pain     both legs    Diabetes ([address]     Headache     Hypertension     Sleep apnea     cpap    Wound of ankle     treatment of left ankle wound       Past Surgical History:  Past Surgical History:   Procedure Laterality Date    HX ORTHOPAEDIC      left knee replacement left ankle reconstruction    [place] LAP GASTR BYPASS INCL SMLL INT RECON  2015    Dr. Rodriguez Knee Bilateral 2013    vein stripping    VASCULAR SURGERY PROCEDURE UNLIST      left leg ankle vein 17       Family History:  Family History   Problem Relation Age of Onset    Heart Disease Father     Hypertension Mother        Social History:  Social History     Tobacco Use    Smoking status: Former Smoker     Quit date: 1972     Years since quittin.8    Smokeless tobacco: Never Used    Tobacco comment: over 40 year   Substance Use Topics    Alcohol use: No     Alcohol/week: 0.0 standard drinks    Drug use: No       Allergies:  No Known Allergies      Review of Systems     Review of Systems   Unable to perform ROS: Acuity of condition   HENT: Positive for nosebleeds.         Physical Exam     Physical Exam  Vitals and nursing note reviewed. Constitutional:       General: He is not in acute distress. Appearance: He is well-developed and normal weight. He is not toxic-appearing. HENT:      Head: Normocephalic and atraumatic. Nose:      Right Nostril: Epistaxis (unable to obtain bloodless field) present. No septal hematoma. Left Nostril: No epistaxis. Mouth/Throat:      Pharynx: No pharyngeal swelling, oropharyngeal exudate, posterior oropharyngeal erythema or uvula swelling. Tonsils: No tonsillar exudate or tonsillar abscesses. 3+ on the right. 3+ on the left. Eyes:      Extraocular Movements: Extraocular movements intact. Pupils: Pupils are equal, round, and reactive to light. Cardiovascular:      Rate and Rhythm: Normal rate and regular rhythm. Pulmonary:      Effort: Pulmonary effort is normal. No respiratory distress. Breath sounds: Normal breath sounds. No stridor. No wheezing. Chest:      Chest wall: No tenderness. Abdominal:      General: Abdomen is flat. Palpations: Abdomen is soft. Tenderness: There is no abdominal tenderness. There is no guarding. Hernia: No hernia is present. Musculoskeletal:         General: No swelling, tenderness or deformity. Normal range of motion. Cervical back: Normal range of motion and neck supple. Lymphadenopathy:      Cervical: No cervical adenopathy. Skin:     General: Skin is warm and dry. Capillary Refill: Capillary refill takes less than 2 seconds. Neurological:      General: No focal deficit present. Mental Status: He is alert and oriented to person, place, and time. Cranial Nerves: No cranial nerve deficit.    Psychiatric:         Mood and Affect: Mood normal.         Behavior: Behavior normal.         Lab and Diagnostic Study Results     Labs -     Recent Results (from the past 12 hour(s))   CBC WITH AUTOMATED DIFF    Collection Time: 05/31/21  2:00 AM   Result Value Ref Range    WBC 4.0 (L) 4.6 - 13.2 K/uL    RBC 3.77 (L) 4.70 - 5.50 M/uL    HGB 10.5 (L) 13.0 - 16.0 g/dL    HCT 33.5 (L) 36.0 - 48.0 %    MCV 88.9 74.0 - 97.0 FL    MCH 27.9 24.0 - 34.0 PG    MCHC 31.3 31.0 - 37.0 g/dL    RDW 15.8 (H) 11.6 - 14.5 %    PLATELET 235 553 - 753 K/uL    MPV 10.0 9.2 - 11.8 FL    NEUTROPHILS 66 40 - 73 %    LYMPHOCYTES 23 21 - 52 %    MONOCYTES 8 3 - 10 %    EOSINOPHILS 2 0 - 5 %    BASOPHILS 1 0 - 2 %    IMMATURE GRANULOCYTES 0 %    ABS. NEUTROPHILS 2.6 1.8 - 8.0 K/UL    ABS. LYMPHOCYTES 0.9 0.9 - 3.6 K/UL    ABS. MONOCYTES 0.3 0.05 - 1.2 K/UL    ABS. EOSINOPHILS 0.1 0.0 - 0.4 K/UL    ABS. BASOPHILS 0.0 0.0 - 0.1 K/UL    ABS. IMM. GRANS. 0.0 K/UL   METABOLIC PANEL, BASIC    Collection Time: 05/31/21  2:00 AM   Result Value Ref Range    Sodium 139 135 - 145 mmol/L    Potassium 3.4 3.2 - 5.1 mmol/L    Chloride 106 94 - 111 mmol/L    CO2 25 21 - 33 mmol/L    Anion gap 8 mmol/L    Glucose 136 (H) 70 - 110 mg/dL    BUN 24 (H) 9 - 21 mg/dL    Creatinine 1.00 0.8 - 1.50 mg/dL    BUN/Creatinine ratio 24      GFR est AA >60 ml/min/1.73m2    GFR est non-AA >60 ml/min/1.73m2    Calcium 8.7 8.5 - 10.5 mg/dL   PROTHROMBIN TIME + INR    Collection Time: 05/31/21  2:00 AM   Result Value Ref Range    Prothrombin time 13.2 11.5 - 15.2 sec    INR 1.1 0.8 - 1.2         Radiologic Studies -   @lastxrresult@  CT Results  (Last 48 hours)    None        CXR Results  (Last 48 hours)    None            Medical Decision Making   - I am the first provider for this patient. - I reviewed the vital signs, available nursing notes, past medical history, past surgical history, family history and social history. - Initial assessment performed. The patients presenting problems have been discussed, and they are in agreement with the care plan formulated and outlined with them. I have encouraged them to ask questions as they arise throughout their visit. Vital Signs-Reviewed the patient's vital signs.   Patient Vitals for the past 12 hrs: Temp Pulse Resp BP SpO2   05/31/21 0206  87 18 (!) 163/93 98 %   05/31/21 0155  92 18 (!) 156/97    05/31/21 0125 98.3 °F (36.8 °C) 95 20 (!) 192/71 99 %       Records Reviewed: Nursing Notes and Old Medical Records          ED Course:      After insertion of Epistax and observation over 1 hour, no further bleeding. CBC and PT are normal, with Hgb 10.5. Patient advised to follow up with ENT within 2--3 days and return here for catheter removal at 3 days if not able to secure appointment. He was also advised to see PMD to monitor/manage BP. Provider Notes (Medical Decision Making):     MDM  Number of Diagnoses or Management Options         Procedures   Medical Decision Makingedical Decision Making  Performed by: Maria Isabel Berg MD  PROCEDURES:  Epistaxis Management    Date/Time: 5/31/2021 2:02 AM  Performed by: Luzma Jc MD  Authorized by: Luzma Jc MD     Consent:     Consent obtained:  Verbal    Consent given by:  Patient    Risks discussed:  Bleeding, infection, nasal injury and pain    Alternatives discussed:  No treatment  Anesthesia (see MAR for exact dosages): Anesthesia method:  Topical application    Topical anesthetic:  Benzocaine gel  Procedure details:     Treatment site:  R anterior and R posterior    Treatment method:  Nasal balloon    Treatment complexity:  Extensive    Treatment episode: recurring    Post-procedure details:     Assessment:  Bleeding stopped    Patient tolerance of procedure: Tolerated well, no immediate complications  Comments:      Also instilled R nare oxymetazoline 2 sprays prior to insertion of Epistax catheter. Disposition   Disposition: DC-The patient and niece was given verbal follow-up and ED return instructions        DISCHARGE PLAN:  1.    Current Discharge Medication List      CONTINUE these medications which have NOT CHANGED    Details   oxymetazoline HCl (AFRIN NASAL SPRAY NA) 2 Sprays by Nasal route two (2) times daily as needed (for nose bleeds). ergocalciferol (ERGOCALCIFEROL) 1,250 mcg (50,000 unit) capsule Take 50,000 Units by mouth.      pentoxifylline CR (TRENTAL) 400 mg CR tablet 400 mg three (3) times daily (with meals). hydroCHLOROthiazide (HYDRODIURIL) 25 mg tablet 25 mg daily. !! cyanocobalamin (Nascobal) 500 mcg/spray spry 1 [place] by Nasal route every seven (7) days. Indications: prevention of vitamin B12 deficiency  Qty: 5.2 mL, Refills: 12      !! cyanocobalamin (NASCOBAL) 500 mcg/spray spry 500 mcg by Nasal route every seven (7) days. Use one spray in one nostril once a week as directed  Qty: 2.3 mL, Refills: 5    Associated Diagnoses: Post-resection malabsorption      losartan (COZAAR) 100 mg tablet Take 100 mg by mouth daily. 1/2 tab daily      Calcium-Cholecalciferol, D3, 600 mg(1,500mg) -400 unit chew Take  by mouth. FERROUS SULFATE, DRIED (IRON, DRIED, PO) Take  by mouth.      multivitamin (ONE A DAY) tablet Take 1 Tab by mouth daily. allopurinol (ZYLOPRIM) 300 mg tablet Take 300 mg by mouth daily. metoprolol (LOPRESSOR) 50 mg tablet Take 50 mg by mouth nightly. amLODIPine (NORVASC) 10 mg tablet       ascorbic acid (VITAMIN C) 500 mg tablet Take  by mouth. ferrous sulfate (IRON) 325 mg (65 mg iron) tablet Take  by mouth Daily (before breakfast). cholecalciferol, vitamin D3, (VITAMIN D3) 2,000 unit tab Take  by mouth. !! - Potential duplicate medications found. Please discuss with provider. 2.   Follow-up Information     Follow up With Specialties Details Why Contact Info    Angie Vance DO Otolaryngology Schedule an appointment as soon as possible for a visit in 2 days for reevaluation of today's complaint [address]  [phone]      [facility] EMERGENCY DEPT Emergency Medicine In 3 days If symptoms worsen or, for follow up visit [employer] [id number] [phone]        3.   Return to ED if worse   4. Current Discharge Medication List            Diagnosis     Clinical Impression:   1. Right-sided epistaxis    2. Essential hypertension        Attestations:    Cathy Reynoso MD    Please note that this dictation was completed with [employer], the computer voice recognition software. Quite often unanticipated grammatical, syntax, homophones, and other interpretive errors are inadvertently transcribed by the computer software. Please disregard these errors. Please excuse any errors that have escaped final proofreading. Thank you.

## 2021-05-31 NOTE — ED TRIAGE NOTES
Patient states he has been having nose bleeds off on for the past week. Reports he was seen at [facility] a few hours ago but the bleeding had stopped before he got there and they didn't have to do anything and he was discharged then bleeding started after he got home.

## 2021-05-31 NOTE — ED NOTES
Patient resting on stretcher and no further bleeding noted coming from right nostril and Dr. Mandy Pena aware.

## 2021-05-31 NOTE — ED PROVIDER NOTES
EMERGENCY DEPARTMENT HISTORY AND PHYSICAL EXAM      Date: 5/31/2021  Patient Name: Марина Alvarado    History of Presenting Illness     Chief Complaint   Patient presents with    Wound Check       History Provided By: Patient    HPI: Марина Alvarado, 64 y.o. male with a past medical history significant for hypertension; gastric bypass; DM; obesity; TAIWO that presents to the ED with cc of right nasal oozing. Pt seen earlier today and had a nasal balloon placed for a right anterior nasal epistaxis and states that he has been having some  Oozing of blood around the nasal balloon. No left sided epistaxis and not draining into pharynx. There are no other complaints, changes, or physical findings at this time. PCP: Riley Angelo MD    Current Facility-Administered Medications on File Prior to Encounter   Medication Dose Route Frequency Provider Last Rate Last Admin    [COMPLETED] oxymetazoline (AFRIN) 0.05 % nasal spray 2 Spray  2 Spray Right Nostril NOW Rodrigo Richardson MD   2 Spray at 05/31/21 0140    [COMPLETED] benzocaine (HURRICANE) 20 % spray   Mucous Membrane ONCE Rodrigo Richardson MD   Given at 05/31/21 0140    [COMPLETED] sodium chloride 0.9 % bolus infusion 1,000 mL  1,000 mL IntraVENous ONCE Rodrigo Richardson MD   IV Completed at 05/31/21 5621    [DISCONTINUED] silver nitrate applicators (ARZOL) 1 Applicator  1 Applicator Topical NOW Rodrigo Richardson MD        [DISCONTINUED] hydrALAZINE (APRESOLINE) 20 mg/mL injection 5 mg  5 mg IntraVENous ONCE Rodrigo Richardson MD         Current Outpatient Medications on File Prior to Encounter   Medication Sig Dispense Refill    oxymetazoline HCl (AFRIN NASAL SPRAY NA) 2 Sprays by Nasal route two (2) times daily as needed (for nose bleeds).  ergocalciferol (ERGOCALCIFEROL) 1,250 mcg (50,000 unit) capsule Take 50,000 Units by mouth.       pentoxifylline CR (TRENTAL) 400 mg CR tablet 400 mg three (3) times daily (with meals).  hydroCHLOROthiazide (HYDRODIURIL) 25 mg tablet 25 mg daily.  cyanocobalamin (Nascobal) 500 mcg/spray spry 1 [place] by Nasal route every seven (7) days. Indications: prevention of vitamin B12 deficiency 5.2 mL 12    cyanocobalamin (NASCOBAL) 500 mcg/spray spry 500 mcg by Nasal route every seven (7) days. Use one spray in one nostril once a week as directed 2.3 mL 5    losartan (COZAAR) 100 mg tablet Take 100 mg by mouth daily. 1/2 tab daily      Calcium-Cholecalciferol, D3, 600 mg(1,500mg) -400 unit chew Take  by mouth.  FERROUS SULFATE, DRIED (IRON, DRIED, PO) Take  by mouth.  multivitamin (ONE A DAY) tablet Take 1 Tab by mouth daily.  allopurinol (ZYLOPRIM) 300 mg tablet Take 300 mg by mouth daily.  metoprolol (LOPRESSOR) 50 mg tablet Take 50 mg by mouth nightly.  amLODIPine (NORVASC) 10 mg tablet       ascorbic acid (VITAMIN C) 500 mg tablet Take  by mouth.  ferrous sulfate (IRON) 325 mg (65 mg iron) tablet Take  by mouth Daily (before breakfast).  cholecalciferol, vitamin D3, (VITAMIN D3) 2,000 unit tab Take  by mouth.          Past History     Past Medical History:  Past Medical History:   Diagnosis Date    Arthritis     Chronic pain     both legs    Diabetes ([address]     Headache     Hypertension     Sleep apnea     cpap    Wound of ankle     treatment of left ankle wound       Past Surgical History:  Past Surgical History:   Procedure Laterality Date    HX ORTHOPAEDIC      left knee replacement left ankle reconstruction    [place] LAP GASTR BYPASS INCL SMLL INT RECON  12/02/2015    Dr. Joel Lu Bilateral 2013    vein stripping    VASCULAR SURGERY PROCEDURE UNLIST      left leg ankle vein 12-14-17       Family History:  Family History   Problem Relation Age of Onset    Heart Disease Father     Hypertension Mother        Social History:  Social History     Tobacco Use    Smoking status: Former Smoker     Quit date: 1972     Years since quittin.9    Smokeless tobacco: Never Used    Tobacco comment: over 40 year   Substance Use Topics    Alcohol use: No     Alcohol/week: 0.0 standard drinks    Drug use: No       Allergies:  No Known Allergies      Review of Systems     Review of Systems   Constitutional: Negative for chills and fever. HENT: Positive for nosebleeds. Negative for sore throat and trouble swallowing. Eyes: Negative for visual disturbance. Respiratory: Negative for shortness of breath. Cardiovascular: Negative for chest pain. Gastrointestinal: Negative for abdominal pain, nausea and vomiting. Genitourinary: Negative for dysuria. Musculoskeletal: Negative for arthralgias. Skin: Negative for rash. Neurological: Negative for light-headedness and headaches. Psychiatric/Behavioral: Negative for agitation. Physical Exam     Physical Exam  Vitals and nursing note reviewed. Constitutional:       General: He is not in acute distress. Appearance: Normal appearance. He is obese. He is not ill-appearing, toxic-appearing or diaphoretic. HENT:      Head: Normocephalic. Nose:      Comments: Has right nasal balloon in place. There is a small amount of blood draining anteriorly. No post pharynx bleeding noted; left nares w/o septal deviation or hematoma; no bleeding. Cardiovascular:      Rate and Rhythm: Normal rate and regular rhythm. Pulmonary:      Effort: Pulmonary effort is normal.   Musculoskeletal:         General: Normal range of motion. Cervical back: Neck supple. Skin:     General: Skin is warm and dry. Capillary Refill: Capillary refill takes less than 2 seconds. Neurological:      Mental Status: He is alert and oriented to person, place, and time.    Psychiatric:         Behavior: Behavior normal.         Lab and Diagnostic Study Results     Labs -   No results found for this or any previous visit (from the past 12 hour(s)). Radiologic Studies -   @lastxrresult@  CT Results  (Last 48 hours)    None        CXR Results  (Last 48 hours)    None            Medical Decision Making   - I am the first provider for this patient. - I reviewed the vital signs, available nursing notes, past medical history, past surgical history, family history and social history. - Initial assessment performed. The patients presenting problems have been discussed, and they are in agreement with the care plan formulated and outlined with them. I have encouraged them to ask questions as they arise throughout their visit. Vital Signs-Reviewed the patient's vital signs. Patient Vitals for the past 12 hrs:   Temp Pulse Resp BP SpO2   05/31/21 1511 98.5 °F (36.9 °C) 70 18 (!) 182/98 98 %       Records Reviewed: Old Medical Records        ED Course: An additional 5 ml of air put in the 30 ml and the 10 ml balloons w/o complications    Procedures       Disposition   Disposition:   Discharged    DISCHARGE PLAN:  1. Current Discharge Medication List      CONTINUE these medications which have NOT CHANGED    Details   oxymetazoline HCl (AFRIN NASAL SPRAY NA) 2 Sprays by Nasal route two (2) times daily as needed (for nose bleeds). ergocalciferol (ERGOCALCIFEROL) 1,250 mcg (50,000 unit) capsule Take 50,000 Units by mouth.      pentoxifylline CR (TRENTAL) 400 mg CR tablet 400 mg three (3) times daily (with meals). hydroCHLOROthiazide (HYDRODIURIL) 25 mg tablet 25 mg daily. !! cyanocobalamin (Nascobal) 500 mcg/spray spry 1 [place] by Nasal route every seven (7) days. Indications: prevention of vitamin B12 deficiency  Qty: 5.2 mL, Refills: 12      !! cyanocobalamin (NASCOBAL) 500 mcg/spray spry 500 mcg by Nasal route every seven (7) days. Use one spray in one nostril once a week as directed  Qty: 2.3 mL, Refills: 5    Associated Diagnoses: Post-resection malabsorption      losartan (COZAAR) 100 mg tablet Take 100 mg by mouth daily.  1/2 tab daily      Calcium-Cholecalciferol, D3, 600 mg(1,500mg) -400 unit chew Take  by mouth. FERROUS SULFATE, DRIED (IRON, DRIED, PO) Take  by mouth.      multivitamin (ONE A DAY) tablet Take 1 Tab by mouth daily. allopurinol (ZYLOPRIM) 300 mg tablet Take 300 mg by mouth daily. metoprolol (LOPRESSOR) 50 mg tablet Take 50 mg by mouth nightly. amLODIPine (NORVASC) 10 mg tablet       ascorbic acid (VITAMIN C) 500 mg tablet Take  by mouth. ferrous sulfate (IRON) 325 mg (65 mg iron) tablet Take  by mouth Daily (before breakfast). cholecalciferol, vitamin D3, (VITAMIN D3) 2,000 unit tab Take  by mouth. !! - Potential duplicate medications found. Please discuss with provider. 2.   Follow-up Information     Follow up With Specialties Details Why Contact Info    Marcine Phalen, DO Otolaryngology Schedule an appointment as soon as possible for a visit in 1 day  [address] [address]  [phone]          3. Return to ED if worse   4. Current Discharge Medication List      START taking these medications    Details   cephALEXin (Keflex) 500 mg capsule Take 1 Capsule by mouth two (2) times a day for 3 days. Qty: 6 Capsule, Refills: 0  Start date: 5/31/2021, End date: 6/3/2021               Diagnosis     Clinical Impression:   1. Anterior epistaxis        Attestations:    Joi Pro MD    Please note that this dictation was completed with [employer], the computer voice recognition software. Quite often unanticipated grammatical, syntax, homophones, and other interpretive errors are inadvertently transcribed by the computer software. Please disregard these errors. Please excuse any errors that have escaped final proofreading. Thank you.

## 2021-05-31 NOTE — ED TRIAGE NOTES
Pt states he was seen in the ED last night around 2am for right sided nose bleed. Pt had epistax packing placed, and was told he needed to keep it in until Wednesday (2 days from now)  Pt came to the ED today because his nare is still draining and they told him the balloon may need to be inflated more.

## 2021-05-31 NOTE — ED NOTES
No further breakthrough bleeding noted around nasal packing. Will continue to monitor. Orders given to hold Hydralazine at this time since blood pressure has improved.

## 2021-12-22 ENCOUNTER — OFFICE VISIT (OUTPATIENT)
Dept: SURGERY | Age: 56
End: 2021-12-22
Payer: COMMERCIAL

## 2021-12-22 VITALS
HEIGHT: 77 IN | DIASTOLIC BLOOD PRESSURE: 79 MMHG | HEART RATE: 60 BPM | SYSTOLIC BLOOD PRESSURE: 125 MMHG | OXYGEN SATURATION: 96 % | WEIGHT: 315 LBS | TEMPERATURE: 99.3 F | BODY MASS INDEX: 37.19 KG/M2

## 2021-12-22 DIAGNOSIS — Z98.84 WEIGHT GAIN STATUS POST GASTRIC BYPASS: ICD-10-CM

## 2021-12-22 DIAGNOSIS — R63.5 WEIGHT GAIN STATUS POST GASTRIC BYPASS: ICD-10-CM

## 2021-12-22 DIAGNOSIS — K91.2 POST-RESECTION MALABSORPTION: Primary | ICD-10-CM

## 2021-12-22 DIAGNOSIS — Z98.84 S/P GASTRIC BYPASS: ICD-10-CM

## 2021-12-22 DIAGNOSIS — E66.01 MORBID OBESITY (HCC): ICD-10-CM

## 2021-12-22 PROCEDURE — 99213 OFFICE O/P EST LOW 20 MIN: CPT | Performed by: NURSE PRACTITIONER

## 2021-12-22 NOTE — PROGRESS NOTES
Bariatric Postoperative Progress Note    CC: Annual LGBP Follow Up    Minnie Guzman is a 64 y.o. male now 6 years status post laparoscopic gastric bypass surgery performed in 2015. Doing well overall. Currently eating chicken, fish, seafood, pork, collards, peas, beans, apples, pears, strawberries, chips, popcorn, chocolate, pasta,rice, potatoes. Taking in 64+ oz water,  Unknown g protein. 0 min of activity 7 days a week. Bowel movements are alternating constipation and regularity. The patient is not having any pain. He is compliant with multivitamins, calcium, Vit D and B12 supplements. Was down to 310 earlier this year, but mother passed and has been grieving, emotional eating and snacking. Denies ETOH or tobacco use.  Aleve prn    Weight Loss Metrics 12/22/2021 12/22/2021 5/31/2021 5/31/2021 12/18/2020 12/18/2020 12/26/2019   Pre op / Initial Wt 432 - - - 432 - 432   Today's Wt - 339 lb 310 lb 310 lb - 344 lb 8 oz -   BMI - 40.2 kg/m2 36.76 kg/m2 36.76 kg/m2 - 40.85 kg/m2 -   Ideal Body Wt 183 - - - 183 - 183   Excess Body Wt 249 - - - 249 - 249   Goal Wt 233 - - - 233 - 233   Wt loss to date 93 - - - 87.5 - -   % Wt Loss 0.47 - - - 0.44 - -   80% .2 - - - 199.2 - 199.2       Comorbidities:  Hypertension: unchanged  Diabetes: resolved  Obstructive Sleep Apnea: resolved  Hyperlipidemia: not applicable  Stress Urinary Incontinence: not applicable  Gastroesophageal Reflux: not applicable  Weight related arthropathy:not applicable        Past Medical History:   Diagnosis Date    Arthritis     Chronic pain     both legs    Diabetes ([address]     Headache     Hypertension     Sleep apnea     cpap    Wound of ankle     treatment of left ankle wound       Past Surgical History:   Procedure Laterality Date    HX ORTHOPAEDIC      left knee replacement left ankle reconstruction    [place] LAP GASTR BYPASS INCL SMLL INT RECON  12/02/2015    Dr. Indio Kaplan Bilateral 2013    vein stripping    VASCULAR SURGERY PROCEDURE UNLIST      left leg ankle vein 12-14-17       Current Outpatient Medications   Medication Sig Dispense Refill    ergocalciferol (ERGOCALCIFEROL) 1,250 mcg (50,000 unit) capsule Take 50,000 Units by mouth.  hydroCHLOROthiazide (HYDRODIURIL) 25 mg tablet 25 mg daily.  cyanocobalamin (Nascobal) 500 mcg/spray spry 1 [place] by Nasal route every seven (7) days. Indications: prevention of vitamin B12 deficiency 5.2 mL 12    losartan (COZAAR) 100 mg tablet Take 100 mg by mouth daily. 1/2 tab daily      Calcium-Cholecalciferol, D3, 600 mg(1,500mg) -400 unit chew Take  by mouth.  multivitamin (ONE A DAY) tablet Take 1 Tab by mouth daily.  allopurinol (ZYLOPRIM) 300 mg tablet Take 300 mg by mouth daily.  metoprolol (LOPRESSOR) 50 mg tablet Take 50 mg by mouth nightly.  amLODIPine (NORVASC) 10 mg tablet       ascorbic acid (VITAMIN C) 500 mg tablet Take  by mouth.  ferrous sulfate (IRON) 325 mg (65 mg iron) tablet Take  by mouth Daily (before breakfast).  cholecalciferol, vitamin D3, (VITAMIN D3) 2,000 unit tab Take  by mouth. No Known Allergies    ROS:  Review of Systems   Constitutional: Negative for malaise/fatigue and weight loss. Eyes: Negative. Cardiovascular: Negative for chest pain and palpitations. Gastrointestinal: Positive for constipation. Negative for abdominal pain, blood in stool, diarrhea, heartburn, melena, nausea and vomiting. Genitourinary: Negative. Musculoskeletal: Positive for joint pain. Skin: Negative. Neurological: Negative for dizziness, tingling, loss of consciousness, weakness and headaches. Physicial Exam:  Visit Vitals  /79 (BP 1 Location: Left upper arm, BP Patient Position: Sitting)   Pulse 60   Temp 99.3 °F (37.4 °C)   Ht 6' 5\" (1.956 m)   Wt 153.8 kg (339 lb)   SpO2 96%   BMI 40.20 kg/m²     Physical Exam  Vitals and nursing note reviewed.    Constitutional: Appearance: He is obese. HENT:      Head: Normocephalic and atraumatic. Cardiovascular:      Rate and Rhythm: Normal rate and regular rhythm. Pulses: Normal pulses. Heart sounds: Normal heart sounds. Pulmonary:      Effort: Pulmonary effort is normal.      Breath sounds: Normal breath sounds. Abdominal:      General: Bowel sounds are normal. There is no distension. Palpations: Abdomen is soft. There is no mass. Tenderness: There is no abdominal tenderness. Hernia: No hernia is present. Musculoskeletal:         General: Normal range of motion. Skin:     General: Skin is warm and dry. Neurological:      General: No focal deficit present. Mental Status: He is alert and oriented to person, place, and time. Psychiatric:         Mood and Affect: Mood normal.         Behavior: Behavior normal.         Labs:   No results found for this or any previous visit (from the past 672 hour(s)). Assessment/Plan:   He is currently 6 years s/p laparoscopic gastric bypass surgery with a total weight loss of 93 lbs to date, struggling with grief from loss of mother and emotional eating. Pt reports he had labs completed with PCP last month. Will request records for review. Long discussion regarding high density carbohydrates and their impacts to weight regain, hunger, cravings and reactive hypoglycemia. He reports he will get back on track. Discussed that grief can lead to coping mechanisms such as emotional eating and recommended he talk with someone. We have reviewed the components of a successful postoperative course including requirement for a high protein, low carbohydrate diet, 64 oz a day of zero calorie liquids, daily vitamin supplementation, daily exercise (150 mis/week), regular follow-up, and participation in support groups. Refer to registered dietitian for more detailed medical nutrition therapy as needed.      The primary encounter diagnosis was Post-resection malabsorption. Diagnoses of S/P gastric bypass, Weight gain status post gastric bypass, Morbid obesity ([facility] [address] and BMI 40.0-44.9, adult [facility]) were also pertinent to this visit. Follow-up and Dispositions    · Return in about 1 year (around 12/22/2022). with labs, sooner as needed.   Shankar Gill NP

## 2022-01-10 DIAGNOSIS — Z98.84 S/P GASTRIC BYPASS: ICD-10-CM

## 2022-01-10 DIAGNOSIS — E66.01 MORBID OBESITY (HCC): ICD-10-CM

## 2022-01-10 DIAGNOSIS — K91.2 POST-RESECTION MALABSORPTION: Primary | ICD-10-CM

## 2022-01-10 DIAGNOSIS — K91.2 POST-RESECTION MALABSORPTION: ICD-10-CM

## 2022-01-10 RX ORDER — CYANOCOBALAMIN 500 UG/1
1 SPRAY NASAL
Qty: 5.2 ML | Refills: 12 | Status: SHIPPED | OUTPATIENT
Start: 2022-01-10 | End: 2022-10-19 | Stop reason: ALTCHOICE

## 2022-01-10 NOTE — PROGRESS NOTES
Received labs from PCP that were performed in August.  Spoke with pt regarding results, will order CMP, iron profile, D, B1, and B12. Will mail paper orders to pt and he will get these completed.

## 2022-01-11 RX ORDER — CYANOCOBALAMIN 500 UG/1
1 SPRAY NASAL
Qty: 5.2 ML | Refills: 12 | OUTPATIENT
Start: 2022-01-11

## 2022-01-11 NOTE — TELEPHONE ENCOUNTER
[facility] [place] with [facility] pharmacy called to have medication Nascobal approved for refill. [facility] call for a verbal direct line is [phone]. [facility] to [facility] due to it is secure VM and info can be left on it. Requested Prescriptions     Pending Prescriptions Disp Refills    cyanocobalamin (Nascobal) 500 mcg/spray spry 5.2 mL 12     Si Spray by Nasal route every seven (7) days.  Indications: prevention of vitamin B12 deficiency

## 2022-03-01 ENCOUNTER — TELEPHONE (OUTPATIENT)
Dept: SURGERY | Age: 57
End: 2022-03-01

## 2022-03-19 PROBLEM — Z98.84 GASTRIC BYPASS STATUS FOR OBESITY: Status: ACTIVE | Noted: 2017-12-20

## 2022-10-19 ENCOUNTER — TELEPHONE (OUTPATIENT)
Dept: SURGERY | Age: 57
End: 2022-10-19

## 2022-10-19 DIAGNOSIS — Z98.84 S/P GASTRIC BYPASS: ICD-10-CM

## 2022-10-19 DIAGNOSIS — K91.2 POST-RESECTION MALABSORPTION: Primary | ICD-10-CM

## 2022-10-19 RX ORDER — CYANOCOBALAMIN 500 UG/1
500 SPRAY NASAL
Qty: 0.5 ML | Refills: 3 | Status: SHIPPED | OUTPATIENT
Start: 2022-10-19

## 2022-10-19 NOTE — TELEPHONE ENCOUNTER
Last Office Visit- 12/22/21  Next Office Visit-12/28/22  Last refill- 1/10/22  Drug name: Nascobal 500 mcg/ spray spry : # 12  Refills given

## 2022-12-23 DIAGNOSIS — K91.2 POSTOPERATIVE MALABSORPTION: Primary | ICD-10-CM

## 2022-12-28 ENCOUNTER — OFFICE VISIT (OUTPATIENT)
Dept: SURGERY | Age: 57
End: 2022-12-28
Payer: COMMERCIAL

## 2022-12-28 VITALS
RESPIRATION RATE: 16 BRPM | HEART RATE: 57 BPM | DIASTOLIC BLOOD PRESSURE: 76 MMHG | SYSTOLIC BLOOD PRESSURE: 126 MMHG | HEIGHT: 77 IN | TEMPERATURE: 98.2 F | OXYGEN SATURATION: 99 % | WEIGHT: 315 LBS | BODY MASS INDEX: 37.19 KG/M2

## 2022-12-28 DIAGNOSIS — Z98.84 S/P GASTRIC BYPASS: ICD-10-CM

## 2022-12-28 DIAGNOSIS — E66.9 OBESITY (BMI 30-39.9): ICD-10-CM

## 2022-12-28 DIAGNOSIS — K91.2 POST-RESECTION MALABSORPTION: Primary | ICD-10-CM

## 2022-12-28 PROCEDURE — 3074F SYST BP LT 130 MM HG: CPT | Performed by: NURSE PRACTITIONER

## 2022-12-28 PROCEDURE — 99213 OFFICE O/P EST LOW 20 MIN: CPT | Performed by: NURSE PRACTITIONER

## 2022-12-28 PROCEDURE — 3078F DIAST BP <80 MM HG: CPT | Performed by: NURSE PRACTITIONER

## 2022-12-28 RX ORDER — CYANOCOBALAMIN 500 UG/1
500 SPRAY NASAL
Qty: 0.5 ML | Refills: 3 | Status: SHIPPED | OUTPATIENT
Start: 2022-12-28

## 2022-12-28 NOTE — PROGRESS NOTES
Bariatric Postoperative Progress Note    CC: Annual LGBP Follow Up    Dorma Buerger is a 62 y.o. male now  7 years status post laparoscopic gastric bypass surgery performed in 2015. Doing well overall. Currently eating pork chops, chicken, steak, and fish. Taking in 96 oz water,  unknown g protein. 60 min of walking 6 days a week. Bowel movements are alternating constipation and regularity. He is compliant with multivitamins, calcium, Vit D. Has not had B12 in about a month as he was unable to obtain intranasal B12 from pharmacy. Reports he has been better about snacking. Denies any NSAID, ETOH, or tobacco use.      Weight Loss Metrics 12/28/2022 12/28/2022 12/22/2021 12/22/2021 5/31/2021 5/31/2021 12/18/2020   Pre op / Initial Wt 432 - 432 - - - 432   Today's Wt - 330 lb - 339 lb 310 lb 310 lb -   BMI - 39.13 kg/m2 - 40.2 kg/m2 36.76 kg/m2 36.76 kg/m2 -   Ideal Body Wt 182 - 183 - - - 183   Excess Body Wt 250 - 249 - - - 249   Goal Wt 232 - 233 - - - 233   Wt loss to date 102 - 93 - - - 87.5   % Wt Loss 0.51 - 0.47 - - - 0.44   80%  - 199.2 - - - 199.2       Comorbidities:  Hypertension: improved  Diabetes: resolved  Obstructive Sleep Apnea: resolved  Hyperlipidemia: not applicable  Stress Urinary Incontinence: not applicable  Gastroesophageal Reflux: not applicable  Weight related arthropathy:not applicable      Past Medical History:   Diagnosis Date    Arthritis     Chronic pain     both legs    Diabetes (HCC)     Headache     Hypertension     Sleep apnea     cpap    Wound of ankle     treatment of left ankle wound       Past Surgical History:   Procedure Laterality Date    HX ORTHOPAEDIC      left knee replacement left ankle reconstruction    [place] LAP GASTR BYPASS INCL SMLL INT RECON  12/02/2015    Dr. Olu Rivas Bilateral 2013    vein stripping    VASCULAR SURGERY PROCEDURE UNLIST      left leg ankle vein 12-14-17       Current Outpatient Medications Medication Sig Dispense Refill    cyanocobalamin (Nascobal) 500 mcg/spray spry 500 mcg by Nasal route every seven (7) days. 0.5 mL 3    ergocalciferol (ERGOCALCIFEROL) 1,250 mcg (50,000 unit) capsule Take 50,000 Units by mouth. hydroCHLOROthiazide (HYDRODIURIL) 25 mg tablet 25 mg daily. losartan (COZAAR) 100 mg tablet Take 100 mg by mouth daily. 1/2 tab daily      Calcium-Cholecalciferol, D3, 600 mg(1,500mg) -400 unit chew Take  by mouth.      multivitamin (ONE A DAY) tablet Take 1 Tab by mouth daily. allopurinol (ZYLOPRIM) 300 mg tablet Take 300 mg by mouth daily. metoprolol (LOPRESSOR) 50 mg tablet Take 50 mg by mouth nightly. amLODIPine (NORVASC) 10 mg tablet       ascorbic acid, vitamin C, (VITAMIN C) 500 mg tablet Take  by mouth. ferrous sulfate 325 mg (65 mg iron) tablet Take  by mouth Daily (before breakfast). cholecalciferol, vitamin D3, 50 mcg (2,000 unit) tab Take  by mouth. No Known Allergies    ROS:  Review of Systems   Constitutional:  Positive for malaise/fatigue and weight loss. Eyes: Negative. Respiratory: Negative. Cardiovascular:  Negative for chest pain and palpitations. Gastrointestinal:  Positive for constipation. Negative for abdominal pain, blood in stool, diarrhea, heartburn, melena, nausea and vomiting. Skin: Negative. Neurological:  Negative for dizziness, tingling, loss of consciousness, weakness and headaches. Physicial Exam:  Visit Vitals  /76 (BP 1 Location: Left upper arm, BP Patient Position: Sitting)   Pulse (!) 57   Temp 98.2 °F (36.8 °C) (Temporal)   Resp 16   Ht 6' 5\" (1.956 m)   Wt 149.7 kg (330 lb)   SpO2 99%   BMI 39.13 kg/m²     Physical Exam  Vitals and nursing note reviewed. Constitutional:       Appearance: He is obese. HENT:      Head: Normocephalic and atraumatic. Cardiovascular:      Rate and Rhythm: Normal rate and regular rhythm. Heart sounds: Normal heart sounds.    Pulmonary: Effort: Pulmonary effort is normal.      Breath sounds: Normal breath sounds. Abdominal:      General: Bowel sounds are normal. There is no distension. Palpations: Abdomen is soft. There is no mass. Tenderness: There is no abdominal tenderness. Hernia: No hernia is present. Musculoskeletal:         General: Normal range of motion. Skin:     General: Skin is warm and dry. Neurological:      General: No focal deficit present. Mental Status: He is alert and oriented to person, place, and time. Psychiatric:         Mood and Affect: Mood normal.         Behavior: Behavior normal.       Labs:   No results found for this or any previous visit (from the past 672 hour(s)). Assessment/Plan:   He is currently 7 years s/p laparoscopic gastric bypass surgery with a total weight loss of 102 lbs to date, doing well. Labs performed by PCP were reviewed and pt was instructed to continue MVI/B1/B12/D supplementation. Will send in new prescription for intranasal B12 to [address] . If not able to obtain, he will let me know if he would like to try IM B12 or SL B12. Flyer for [employer] Bariatric MVI discussed and given to pt. Constipation protocol reviewed. 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 Discussed to watch the grazing behaviors to prevent weight regain. Increase exercise as tolerated, discussed walking intervals, resistance bands, and getting back into the gym. We have reviewed the components of a successful postoperative course including requirement for a high protein, low carbohydrate diet, 64 oz a day of zero calorie liquids, daily vitamin supplementation, daily exercise (150 mis/week), regular follow-up, and participation in support groups. Refer to registered dietitian for more detailed medical nutrition therapy as needed. The primary encounter diagnosis was Post-resection malabsorption.  Diagnoses of S/P gastric bypass, Obesity (BMI 30-39.9), and BMI 39.0-39.9,adult were also pertinent to this visit. Follow-up and Dispositions    Return in about 1 year (around 12/28/2023). with labs, sooner as needed.   John Crook, NP

## 2023-02-04 ENCOUNTER — APPOINTMENT (OUTPATIENT)
Dept: CT IMAGING | Age: 58
End: 2023-02-04
Attending: EMERGENCY MEDICINE
Payer: COMMERCIAL

## 2023-02-04 ENCOUNTER — HOSPITAL ENCOUNTER (EMERGENCY)
Age: 58
Discharge: HOME | End: 2023-02-04
Attending: EMERGENCY MEDICINE
Payer: COMMERCIAL

## 2023-02-04 VITALS
HEART RATE: 72 BPM | RESPIRATION RATE: 16 BRPM | DIASTOLIC BLOOD PRESSURE: 75 MMHG | SYSTOLIC BLOOD PRESSURE: 123 MMHG | OXYGEN SATURATION: 100 % | HEIGHT: 78 IN | WEIGHT: 315 LBS | BODY MASS INDEX: 36.45 KG/M2 | TEMPERATURE: 97.5 F

## 2023-02-04 DIAGNOSIS — R93.5 ABNORMAL CT OF THE ABDOMEN: ICD-10-CM

## 2023-02-04 DIAGNOSIS — K52.9 ENTERITIS: ICD-10-CM

## 2023-02-04 DIAGNOSIS — R19.7 DIARRHEA, UNSPECIFIED TYPE: ICD-10-CM

## 2023-02-04 DIAGNOSIS — N20.0 NEPHROLITHIASIS: ICD-10-CM

## 2023-02-04 DIAGNOSIS — E86.0 DEHYDRATION: ICD-10-CM

## 2023-02-04 DIAGNOSIS — R10.84 ABDOMINAL PAIN, GENERALIZED: Primary | ICD-10-CM

## 2023-02-04 LAB
ALBUMIN SERPL-MCNC: 3.2 G/DL (ref 3.4–5)
ALBUMIN/GLOB SERPL: 0.7 (ref 0.8–1.7)
ALP SERPL-CCNC: 94 U/L (ref 45–117)
ALT SERPL-CCNC: 27 U/L (ref 16–61)
ANION GAP SERPL CALC-SCNC: 7 MMOL/L (ref 3–18)
AST SERPL W P-5'-P-CCNC: 19 U/L (ref 10–38)
BASOPHILS # BLD: 0 K/UL (ref 0–0.1)
BASOPHILS NFR BLD: 0 % (ref 0–2)
BILIRUB SERPL-MCNC: 0.5 MG/DL (ref 0.2–1)
BUN SERPL-MCNC: 43 MG/DL (ref 7–18)
BUN/CREAT SERPL: 29 (ref 12–20)
CA-I BLD-MCNC: 9 MG/DL (ref 8.5–10.1)
CHLORIDE SERPL-SCNC: 104 MMOL/L (ref 100–111)
CO2 SERPL-SCNC: 25 MMOL/L (ref 21–32)
CREAT SERPL-MCNC: 1.5 MG/DL (ref 0.6–1.3)
DIFFERENTIAL METHOD BLD: ABNORMAL
EOSINOPHIL # BLD: 0 K/UL (ref 0–0.4)
EOSINOPHIL NFR BLD: 0 % (ref 0–5)
ERYTHROCYTE [DISTWIDTH] IN BLOOD BY AUTOMATED COUNT: 16.4 % (ref 11.6–14.5)
GLOBULIN SER CALC-MCNC: 4.6 G/DL (ref 2–4)
GLUCOSE SERPL-MCNC: 103 MG/DL (ref 74–99)
HCT VFR BLD AUTO: 40.6 % (ref 36–48)
HGB BLD-MCNC: 12.9 G/DL (ref 13–16)
IMM GRANULOCYTES # BLD AUTO: 0 K/UL (ref 0–0.04)
IMM GRANULOCYTES NFR BLD AUTO: 0 % (ref 0–0.5)
LACTATE SERPL-SCNC: 1.1 MMOL/L (ref 0.4–2)
LIPASE SERPL-CCNC: 71 U/L (ref 73–393)
LYMPHOCYTES # BLD: 0.9 K/UL (ref 0.9–3.6)
LYMPHOCYTES NFR BLD: 23 % (ref 21–52)
MCH RBC QN AUTO: 27.1 PG (ref 24–34)
MCHC RBC AUTO-ENTMCNC: 31.8 G/DL (ref 31–37)
MCV RBC AUTO: 85.3 FL (ref 78–100)
MONOCYTES # BLD: 0.4 K/UL (ref 0.05–1.2)
MONOCYTES NFR BLD: 11 % (ref 3–10)
NEUTS SEG # BLD: 2.7 K/UL (ref 1.8–8)
NEUTS SEG NFR BLD: 66 % (ref 40–73)
NRBC # BLD: 0 K/UL (ref 0–0.01)
NRBC BLD-RTO: 0 PER 100 WBC
PLATELET # BLD AUTO: 260 K/UL (ref 135–420)
PMV BLD AUTO: 10 FL (ref 9.2–11.8)
POTASSIUM SERPL-SCNC: 3.6 MMOL/L (ref 3.5–5.5)
PROT SERPL-MCNC: 7.8 G/DL (ref 6.4–8.2)
RBC # BLD AUTO: 4.76 M/UL (ref 4.35–5.65)
SODIUM SERPL-SCNC: 136 MMOL/L (ref 136–145)
WBC # BLD AUTO: 4.1 K/UL (ref 4.6–13.2)

## 2023-02-04 PROCEDURE — 74011250636 HC RX REV CODE- 250/636: Performed by: EMERGENCY MEDICINE

## 2023-02-04 PROCEDURE — 80053 COMPREHEN METABOLIC PANEL: CPT

## 2023-02-04 PROCEDURE — 83605 ASSAY OF LACTIC ACID: CPT

## 2023-02-04 PROCEDURE — 85025 COMPLETE CBC W/AUTO DIFF WBC: CPT

## 2023-02-04 PROCEDURE — 74011000636 HC RX REV CODE- 636: Performed by: EMERGENCY MEDICINE

## 2023-02-04 PROCEDURE — 74177 CT ABD & PELVIS W/CONTRAST: CPT

## 2023-02-04 PROCEDURE — 83690 ASSAY OF LIPASE: CPT

## 2023-02-04 PROCEDURE — 96374 THER/PROPH/DIAG INJ IV PUSH: CPT

## 2023-02-04 PROCEDURE — 99285 EMERGENCY DEPT VISIT HI MDM: CPT

## 2023-02-04 RX ORDER — ONDANSETRON 4 MG/1
4 TABLET, FILM COATED ORAL
Qty: 10 TABLET | Refills: 0 | Status: SHIPPED | OUTPATIENT
Start: 2023-02-04

## 2023-02-04 RX ORDER — OMEPRAZOLE 20 MG/1
20 CAPSULE, DELAYED RELEASE ORAL DAILY
Qty: 10 CAPSULE | Refills: 0 | Status: SHIPPED | OUTPATIENT
Start: 2023-02-04

## 2023-02-04 RX ORDER — ONDANSETRON 2 MG/ML
4 INJECTION INTRAMUSCULAR; INTRAVENOUS
Status: COMPLETED | OUTPATIENT
Start: 2023-02-04 | End: 2023-02-04

## 2023-02-04 RX ORDER — ONDANSETRON 4 MG/1
4 TABLET, FILM COATED ORAL
Qty: 10 TABLET | Refills: 0 | Status: SHIPPED | OUTPATIENT
Start: 2023-02-04 | End: 2023-02-04

## 2023-02-04 RX ADMIN — IOPAMIDOL 75 ML: 755 INJECTION, SOLUTION INTRAVENOUS at 16:34

## 2023-02-04 RX ADMIN — ONDANSETRON 4 MG: 2 INJECTION INTRAMUSCULAR; INTRAVENOUS at 16:04

## 2023-02-04 RX ADMIN — SODIUM CHLORIDE 1000 ML: 9 INJECTION, SOLUTION INTRAVENOUS at 16:04

## 2023-02-04 NOTE — ED TRIAGE NOTES
Reports abdominal pain and loose stools since Thursday night. States that he thinks its due to a hotdog that he ate. History of gastric bypass 2016. Last meal was last night.

## 2023-02-04 NOTE — ED PROVIDER NOTES
[facility] EMERGENCY DEPT  EMERGENCY DEPARTMENT HISTORY AND PHYSICAL EXAM      Date: 2023  Patient Name: Yazmin De Guzman  MRN: [id number]  [place] 1965  Date of evaluation: 2023  Provider: Cristal Roach MD   Note Started: 3:16 PM 23    HISTORY OF PRESENT ILLNESS     Chief Complaint   Patient presents with    Abdominal Pain       History Provided By: Patient    HPI: Yazmin De Guzman, 62 y.o. male Gastric bypass, DM, HTN Presents with lower abdominal pain associated with nausea and diarrhea. Started two evenings ago after eating a hot dog. No fever, chills, hematemesis or hematochezia. No cough, SOB or chest pain. Sx are acute and moderate. Has used nothing for symptoms. Pain is mostly lower abdominal  and is acute and mild.     PAST MEDICAL HISTORY   Past Medical History:  Past Medical History:   Diagnosis Date    Arthritis     Chronic pain     both legs    Diabetes ([address]     Headache     Hypertension     Sleep apnea     cpap    Wound of ankle     treatment of left ankle wound       Past Surgical History:  Past Surgical History:   Procedure Laterality Date    HX ORTHOPAEDIC      left knee replacement left ankle reconstruction    AZ LAPS GSTR RSTCV PX W/BYP&SM INT RCNSTJ  2015    Dr. Paras Welch Bilateral 2013    vein stripping    [place] UNLISTED PROCEDURE VASCULAR SURGERY      left leg ankle vein 17       Family History:  Family History   Problem Relation Age of Onset    Heart Disease Father     Hypertension Mother        Social History:  Social History     Tobacco Use    Smoking status: Former     Types: Cigarettes     Quit date: 1972     Years since quittin.5    Smokeless tobacco: Never    Tobacco comments:     over 40 year   Substance Use Topics    Alcohol use: No     Alcohol/week: 0.0 standard drinks    Drug use: No       Allergies:  No Known Allergies    PCP: Marisa Dasilva MD    Current Meds:   Previous Medications ALLOPURINOL (ZYLOPRIM) 300 MG TABLET    Take 300 mg by mouth daily. AMLODIPINE (NORVASC) 10 MG TABLET        ASCORBIC ACID, VITAMIN C, (VITAMIN C) 500 MG TABLET    Take  by mouth. CALCIUM-CHOLECALCIFEROL, D3, 600 MG(1,500MG) -400 UNIT CHEW    Take  by mouth. CHOLECALCIFEROL, VITAMIN D3, 50 MCG (2,000 UNIT) TAB    Take  by mouth. CYANOCOBALAMIN (NASCOBAL) 500 MCG/SPRAY SPRY    500 mcg by Nasal route every seven (7) days. ERGOCALCIFEROL (ERGOCALCIFEROL) 1,250 MCG (50,000 UNIT) CAPSULE    Take 50,000 Units by mouth. FERROUS SULFATE 325 MG (65 MG IRON) TABLET    Take  by mouth Daily (before breakfast). HYDROCHLOROTHIAZIDE (HYDRODIURIL) 25 MG TABLET    25 mg daily. LOSARTAN (COZAAR) 100 MG TABLET    Take 100 mg by mouth daily. 1/2 tab daily    METOPROLOL (LOPRESSOR) 50 MG TABLET    Take 50 mg by mouth nightly. MULTIVITAMIN (ONE A DAY) TABLET    Take 1 Tab by mouth daily. REVIEW OF SYSTEMS   Review of Systems   Constitutional: Negative. HENT: Negative. Respiratory: Negative. Cardiovascular: Negative. Gastrointestinal:  Positive for diarrhea, nausea and vomiting. Genitourinary: Negative. Musculoskeletal: Negative. Neurological: Negative. All other systems reviewed and are negative. Positives and Pertinent negatives as per HPI. PHYSICAL EXAM     ED Triage Vitals   ED Encounter Vitals Group      BP 02/04/23 1457 (!) 122/45      Pulse (Heart Rate) 02/04/23 1457 71      Resp Rate 02/04/23 1457 18      Temp 02/04/23 1457 97.5 °F (36.4 °C)      Temp src --       O2 Sat (%) 02/04/23 1457 100 %      Weight 02/04/23 1458 322 lb      Height 02/04/23 1458 6' 6\"      Physical Exam  Vitals and nursing note reviewed. Constitutional:       Appearance: He is well-developed. He is obese. HENT:      Head: Normocephalic. Eyes:      Extraocular Movements: Extraocular movements intact. Pupils: Pupils are equal, round, and reactive to light.    Cardiovascular:      Rate and Rhythm: Normal rate and regular rhythm. Pulmonary:      Effort: Pulmonary effort is normal.      Breath sounds: Normal breath sounds. Abdominal:      General: Abdomen is protuberant. Bowel sounds are normal.      Palpations: Abdomen is soft. Tenderness: There is no abdominal tenderness. Hernia: No hernia is present. Skin:     General: Skin is warm. Neurological:      General: No focal deficit present. Mental Status: He is alert and oriented to person, place, and time. SCREENINGS               No data recorded        LAB, EKG AND DIAGNOSTIC RESULTS   Labs:  Recent Results (from the past 12 hour(s))   CBC WITH AUTOMATED DIFF    Collection Time: 02/04/23  3:45 PM   Result Value Ref Range    WBC 4.1 (L) 4.6 - 13.2 K/uL    RBC 4.76 4.35 - 5.65 M/uL    HGB 12.9 (L) 13.0 - 16.0 g/dL    HCT 40.6 36.0 - 48.0 %    MCV 85.3 78.0 - 100.0 FL    MCH 27.1 24.0 - 34.0 PG    MCHC 31.8 31.0 - 37.0 g/dL    RDW 16.4 (H) 11.6 - 14.5 %    PLATELET 695 746 - 019 K/uL    MPV 10.0 9.2 - 11.8 FL    NRBC 0.0 0.0  WBC    ABSOLUTE NRBC 0.00 0.00 - 0.01 K/uL    NEUTROPHILS 66 40 - 73 %    LYMPHOCYTES 23 21 - 52 %    MONOCYTES 11 (H) 3 - 10 %    EOSINOPHILS 0 0 - 5 %    BASOPHILS 0 0 - 2 %    IMMATURE GRANULOCYTES 0 0 - 0.5 %    ABS. NEUTROPHILS 2.7 1.8 - 8.0 K/UL    ABS. LYMPHOCYTES 0.9 0.9 - 3.6 K/UL    ABS. MONOCYTES 0.4 0.05 - 1.2 K/UL    ABS. EOSINOPHILS 0.0 0.0 - 0.4 K/UL    ABS. BASOPHILS 0.0 0.0 - 0.1 K/UL    ABS. IMM.  GRANS. 0.0 0.00 - 0.04 K/UL    DF AUTOMATED     METABOLIC PANEL, COMPREHENSIVE    Collection Time: 02/04/23  3:45 PM   Result Value Ref Range    Sodium 136 136 - 145 mmol/L    Potassium 3.6 3.5 - 5.5 mmol/L    Chloride 104 100 - 111 mmol/L    CO2 25 21 - 32 mmol/L    Anion gap 7 3.0 - 18.0 mmol/L    Glucose 103 (H) 74 - 99 mg/dL    BUN 43 (H) 7 - 18 mg/dL    Creatinine 1.50 (H) 0.60 - 1.30 mg/dL    BUN/Creatinine ratio 29 (H) 12 - 20      eGFR 54 (L) >60 ml/min/1.73m2    Calcium 9.0 8.5 - 10.1 mg/dL    Bilirubin, total 0.5 0.2 - 1.0 mg/dL    AST (SGOT) 19 10 - 38 U/L    ALT (SGPT) 27 16 - 61 U/L    Alk. phosphatase 94 45 - 117 U/L    Protein, total 7.8 6.4 - 8.2 g/dL    Albumin 3.2 (L) 3.4 - 5.0 g/dL    Globulin 4.6 (H) 2.0 - 4.0 g/dL    A-G Ratio 0.7 (L) 0.8 - 1.7     LIPASE    Collection Time: 02/04/23  3:45 PM   Result Value Ref Range    Lipase 71 (L) 73 - 393 U/L   LACTIC ACID    Collection Time: 02/04/23  3:45 PM   Result Value Ref Range    Lactic acid 1.1 0.4 - 2.0 mmol/L           Radiologic Studies:  Non-plain film images such as CT, Ultrasound and MRI are read by the radiologist. Plain radiographic images are visualized and preliminarily interpreted by the ED Provider with the below findings:    *    Interpretation per the Radiologist below, if available at the time of this note:  CT ABD PELV W CONT    Result Date: 2/4/2023  EXAM: CT ABD PELV W CONT INDICATION: Abd pain, Gastric bypass COMPARISON: Ultrasound 8/26/2015. CONTRAST: 100 mL of Isovue-370. TECHNIQUE: Following the uneventful intravenous administration of contrast, thin axial images were obtained through the abdomen and pelvis. Coronal and sagittal reconstructions were generated. Oral contrast was not administered. CT dose reduction was achieved through use of a standardized protocol tailored for this examination and automatic exposure control for dose modulation. FINDINGS: VISUALIZED LOWER CHEST: No nodule, mass, or airspace disease. LIVER: No mass or biliary dilatation. GALLBLADDER AND BILIARY: Gallbladder is abnormal and appears undistended with an appearance of multiple areas of infolding or chronic scarring. No biliary ductal dilation. SPLEEN: No mass. PANCREAS: No mass or ductal dilatation. ADRENALS: Unremarkable. KIDNEYS: Bilateral renal cysts with trocar no follow-up. Multiple collecting system stones of the left kidney measuring up to 6 mm with no right-sided stones and no hydronephrosis, hydroureter, or ureteral stones. STOMACH: Postsurgical changes of gastric bypass. Otherwise unremarkable. SMALL BOWEL: Distended fluid-filled loops of proximal small bowel without dilation or evident focal transition. Postsurgical anastomotic suture lines appear as expected. COLON: No dilation or wall thickening. Noninflamed appearing sigmoid and left colon diverticula. APPENDIX: Unremarkable. PERITONEUM: No ascites or pneumoperitoneum. RETROPERITONEUM: No lymphadenopathy or aortic aneurysm. REPRODUCTIVE ORGANS: Prostate and seminal vesicles appear unremarkable. URINARY BLADDER: No mass or calculus. BONES: Mild degenerative spine change and gentle rightward convex lumbar scoliosis. No acute fracture or aggressive lesion. ADDITIONAL COMMENTS: N/A     1. Nondilated fluid-filled loops of small bowel could reflect enteritis or early/mild ileus. No ductal pattern or other acute findings. 2. Nonobstructing left nephrolithiasis, colonic diverticulosis, and additional incidentals as above.        PROCEDURES   Unless otherwise noted below, none  Performed by: Celina Georges MD   Procedures      CRITICAL CARE TIME     EMERGENCY DEPARTMENT COURSE and DIFFERENTIAL DIAGNOSIS/MDM   Vitals:    Vitals:    02/04/23 1458 02/04/23 1600 02/04/23 1700 02/04/23 1800   BP:  123/74 110/64 123/75   Pulse:  70 71 72   Resp:  16 16 16   Temp:       SpO2:  100% 100% 100%   Weight: 146.1 kg (322 lb)      Height: 6' 6\" (1.981 m)           Patient was given the following medications:  Medications   sodium chloride 0.9 % bolus infusion 1,000 mL (1,000 mL IntraVENous New Bag 2/4/23 1604)   ondansetron (ZOFRAN) injection 4 mg (4 mg IntraVENous Given 2/4/23 1604)   iopamidoL (ISOVUE-370) 370 mg iodine /mL (76 %) injection 75 mL (75 mL IntraVENous Given 2/4/23 1634)       CONSULTS: (Who and What was discussed)  None     Chronic Conditions: Gastric bypass  Social Determinants affecting Dx or Tx: None  Counseling:      Records Reviewed (source and summary of external notes): Prior medical records and Nursing notes    CC/HPI Summary, DDx, ED Course, and Reassessment: Patient with history of Gastric Bypass presenting with abdominal pain, NVD. No fever or chills and no hematemsis. Ddx include SBO, constipation, diverticulitis, gastroenteritis and food poisoning. Blood work and CT done. Patient felt better after IV NS 1 liter and IV Zofran 4 MG. No more diarrhea. Discussed Lab and CT findings with him. Disposition Considerations (Tests not done, Shared Decision Making, Pt Expectation of Test or Treatment.): No GBUS as he had no RUG tenderness. He is to get an elective one with his PCP. He also needs to increase fluids and follow up with PCP. ED FINAL IMPRESSION     1. Abdominal pain, generalized    2. Dehydration    3. Diarrhea, unspecified type    4. Enteritis    5. Abnormal CT of the abdomen    6. Nephrolithiasis          DISPOSITION/PLAN   Discharged         PATIENT REFERRED TO:  Follow-up Information       Follow up With Specialties Details Why Contact Info    Yonny Ardon MD Internal Medicine Physician In 2 days  [address]  [phone]                DISCHARGE MEDICATIONS:  Current Discharge Medication List        START taking these medications    Details   ondansetron hcl (ZOFRAN) 4 mg tablet Take 1 Tablet by mouth every eight (8) hours as needed for Nausea, Vomiting or Nausea or Vomiting. Qty: 10 Tablet, Refills: 0  Start date: 2/4/2023      omeprazole (PRILOSEC) 20 mg capsule Take 1 Capsule by mouth daily. Qty: 10 Capsule, Refills: 0  Start date: 2/4/2023               DISCONTINUED MEDICATIONS:  Current Discharge Medication List          I am the Primary Clinician of Record. Gilbert Hansen MD (electronically signed)    (Please note that parts of this dictation were completed with voice recognition software.  Quite often unanticipated grammatical, syntax, homophones, and other interpretive errors are inadvertently transcribed by the computer software. Please disregards these errors.  Please excuse any errors that have escaped final proofreading.)

## 2023-12-29 ENCOUNTER — OFFICE VISIT (OUTPATIENT)
Age: 58
End: 2023-12-29
Payer: COMMERCIAL

## 2023-12-29 VITALS
BODY MASS INDEX: 37.19 KG/M2 | HEIGHT: 77 IN | DIASTOLIC BLOOD PRESSURE: 80 MMHG | SYSTOLIC BLOOD PRESSURE: 138 MMHG | TEMPERATURE: 98 F | OXYGEN SATURATION: 97 % | HEART RATE: 56 BPM | RESPIRATION RATE: 20 BRPM | WEIGHT: 315 LBS

## 2023-12-29 DIAGNOSIS — Z79.1 NSAID LONG-TERM USE: ICD-10-CM

## 2023-12-29 DIAGNOSIS — K91.2 POSTOPERATIVE INTESTINAL MALABSORPTION: Primary | ICD-10-CM

## 2023-12-29 DIAGNOSIS — Z98.84 S/P GASTRIC BYPASS: ICD-10-CM

## 2023-12-29 DIAGNOSIS — E66.01 MORBID OBESITY (HCC): ICD-10-CM

## 2023-12-29 PROCEDURE — 99213 OFFICE O/P EST LOW 20 MIN: CPT | Performed by: NURSE PRACTITIONER

## 2023-12-29 PROCEDURE — 3075F SYST BP GE 130 - 139MM HG: CPT | Performed by: NURSE PRACTITIONER

## 2023-12-29 PROCEDURE — 3079F DIAST BP 80-89 MM HG: CPT | Performed by: NURSE PRACTITIONER

## 2023-12-29 RX ORDER — OMEPRAZOLE 40 MG/1
40 CAPSULE, DELAYED RELEASE ORAL DAILY
Qty: 90 CAPSULE | Refills: 3 | Status: SHIPPED | OUTPATIENT
Start: 2023-12-29 | End: 2024-12-23

## 2023-12-29 RX ORDER — ESCITALOPRAM OXALATE 20 MG/1
20 TABLET ORAL DAILY
COMMUNITY

## 2023-12-29 RX ORDER — CYANOCOBALAMIN 1000 UG/ML
1000 INJECTION, SOLUTION INTRAMUSCULAR; SUBCUTANEOUS
Qty: 3 ML | Refills: 3 | Status: SHIPPED | OUTPATIENT
Start: 2023-12-29

## 2023-12-29 NOTE — PROGRESS NOTES
Bariatric Postoperative Note    CC: Annual Bariatric Follow Up    Vijay Bryant is a 58 y.o. male now 8 years status post laparoscopic gastric bypass surgery performed in 2015.  +16 lbs since last year  Doing well overall.  Trying to focus on high protein/low carb bariatric diet. Taking in 64 oz sugar free fluids,  unknown g protein. Active at work 5 days a week, but not exclusively exercising. Bowel movements are regular. He is compliant with recommended bariatric vitamin supplementation.  Had two deaths in the family which has increased stress/emotional eating. Reports he has good support system in place and does have someone to talk to if needed.   Has been unable to obtain intranasal B12 due to no longer being covered by insurance.   Reports eating usually two meals daily due to busy schedule.   Diet Recall:   B: sausage biscuit and juice  L: something at the school such as pizza/cheeseburger  D: meat and veg    Denies any NSAID, ETOH, or tobacco use. Taking Mobic prn for knee pain prescribed by ortho          12/29/2023     9:09 AM 2/4/2023     2:58 PM 12/28/2022     8:50 AM 12/22/2021    10:19 AM 5/31/2021     3:11 PM 5/31/2021     1:25 AM   Weight Loss Metrics   Pre-op weight (manual entry) 432 lbs        Height 6' 5\" 6' 6\" 6' 5\" 6' 5\" 6' 5\" 6' 5\"   Weight - Scale 346 lbs 322 lbs 330 lbs 339 lbs 310 lbs 310 lbs   BMI (Calculated) 41.1 kg/m2 37.3 kg/m2 39.2 kg/m2 40.3 kg/m2 36.8 kg/m2 36.8 kg/m2   Ideal body weight (manual entry) 182 lbs        EBW in lbs. 250        Weight loss to date in lbs. 86        Percent weight loss (%) 19.91 %        Percent EBW loss (%) 34.4 %            Comorbidities:  Hypertension: improved  Diabetes: resolved  Obstructive Sleep Apnea: resolved  Hyperlipidemia: not applicable  Stress Urinary Incontinence: not applicable  Gastroesophageal Reflux: not applicable  Weight related arthropathy:not applicable      Past Medical History:   Diagnosis Date    Arthritis     Chronic

## 2024-01-02 ASSESSMENT — ENCOUNTER SYMPTOMS
ABDOMINAL PAIN: 0
SHORTNESS OF BREATH: 0
CONSTIPATION: 0
DIARRHEA: 0
WHEEZING: 0
ABDOMINAL DISTENTION: 0
NAUSEA: 0
EYES NEGATIVE: 1
VOMITING: 0
BLOOD IN STOOL: 0
COUGH: 0

## 2024-01-03 ENCOUNTER — CLINICAL DOCUMENTATION (OUTPATIENT)
Facility: HOSPITAL | Age: 59
End: 2024-01-03

## 2024-01-03 NOTE — PROGRESS NOTES
1/3/24:  Patient was contacted to set up a nutrition follow up per Hyacinth.  Patient stated he was at work and would need to call me back.  My contact information was provided to the patient.    Scarlet Mccall MS RD

## 2024-08-27 ENCOUNTER — TRANSCRIBE ORDERS (OUTPATIENT)
Facility: HOSPITAL | Age: 59
End: 2024-08-27

## 2024-08-27 ENCOUNTER — HOSPITAL ENCOUNTER (OUTPATIENT)
Facility: HOSPITAL | Age: 59
Discharge: HOME | End: 2024-08-30
Payer: COMMERCIAL

## 2024-08-27 ENCOUNTER — HOSPITAL ENCOUNTER (OUTPATIENT)
Facility: HOSPITAL | Age: 59
Discharge: HOME | End: 2024-08-30

## 2024-08-27 DIAGNOSIS — M17.11 PRIMARY OSTEOARTHRITIS OF RIGHT KNEE: Primary | ICD-10-CM

## 2024-08-27 DIAGNOSIS — M17.11 PRIMARY OSTEOARTHRITIS OF RIGHT KNEE: ICD-10-CM

## 2024-08-27 LAB — SENTARA SPECIMEN COLLECTION: NORMAL

## 2024-08-27 PROCEDURE — 99001 SPECIMEN HANDLING PT-LAB: CPT

## 2024-08-27 PROCEDURE — 93005 ELECTROCARDIOGRAM TRACING: CPT

## 2024-08-28 LAB
A/G RATIO: 1.4 RATIO (ref 1.1–2.6)
ALBUMIN: 4.1 G/DL (ref 3.5–5)
ALP BLD-CCNC: 158 U/L (ref 25–115)
ALT SERPL-CCNC: 18 U/L (ref 5–40)
ANION GAP SERPL CALCULATED.3IONS-SCNC: 9 MMOL/L (ref 3–15)
APTT: 31 SEC (ref 22–36)
AST SERPL-CCNC: 21 U/L (ref 10–37)
BASOPHILS ABSOLUTE: 0.1 K/UL (ref 0–0.2)
BASOPHILS RELATIVE PERCENT: 1 % (ref 0–2)
BILIRUB SERPL-MCNC: 0.4 MG/DL (ref 0.2–1.2)
BILIRUB SERPL-MCNC: NEGATIVE MG/DL
BLOOD: NEGATIVE
BUN BLDV-MCNC: 14 MG/DL (ref 6–22)
C-REACTIVE PROTEIN: <0.5 MG/DL
CALCIUM SERPL-MCNC: 8.7 MG/DL (ref 8.4–10.5)
CHLORIDE BLD-SCNC: 103 MMOL/L (ref 98–110)
CLARITY, UA: CLEAR
CO2: 28 MMOL/L (ref 20–32)
COLOR, UA: YELLOW
CREAT SERPL-MCNC: 0.8 MG/DL (ref 0.5–1.2)
EKG ATRIAL RATE: 57 BPM
EKG DIAGNOSIS: NORMAL
EKG P AXIS: 48 DEGREES
EKG P-R INTERVAL: 158 MS
EKG Q-T INTERVAL: 410 MS
EKG QRS DURATION: 96 MS
EKG QTC CALCULATION (BAZETT): 399 MS
EKG R AXIS: -10 DEGREES
EKG T AXIS: -24 DEGREES
EKG VENTRICULAR RATE: 57 BPM
EOSINOPHIL # BLD: 2 % (ref 0–6)
EOSINOPHILS ABSOLUTE: 0.1 K/UL (ref 0–0.5)
GFR, ESTIMATED: >60 ML/MIN/1.73 SQ.M.
GLOBULIN: 2.9 G/DL (ref 2–4)
GLUCOSE: 86 MG/DL (ref 70–99)
GLUCOSE: NEGATIVE MG/DL
HCT VFR BLD CALC: 38.9 % (ref 39.3–51.6)
HEMOGLOBIN: 11.5 G/DL (ref 13.1–17.2)
INR BLD: 0.93 (ref 0.89–1.29)
KETONES, URINE: NEGATIVE MG/DL
LEUKOCYTE ESTERASE, URINE: NEGATIVE
LYMPHOCYTES # BLD: 28 % (ref 20–45)
LYMPHOCYTES ABSOLUTE: 1.5 K/UL (ref 1–4.8)
MCH RBC QN AUTO: 26 PG (ref 26–34)
MCHC RBC AUTO-ENTMCNC: 30 G/DL (ref 31–36)
MCV RBC AUTO: 89 FL (ref 80–95)
MONOCYTES ABSOLUTE: 0.5 K/UL (ref 0.1–1)
MONOCYTES: 10 % (ref 3–12)
NEUTROPHILS ABSOLUTE: 3 K/UL (ref 1.8–7.7)
NEUTROPHILS: 59 % (ref 40–75)
NITRITE, URINE: NEGATIVE
PDW BLD-RTO: 17.6 % (ref 10–15.5)
PH, URINE: 6 PH (ref 5–8)
PLATELET # BLD: 265 K/UL (ref 140–440)
PMV BLD AUTO: 11 FL (ref 9–13)
POTASSIUM SERPL-SCNC: 4 MMOL/L (ref 3.5–5.5)
PROTEIN UA: NEGATIVE MG/DL
PROTHROMBIN TIME: 10.3 SEC (ref 9–13)
RBC # BLD: 4.35 M/UL (ref 3.8–5.8)
SED RATE, AUTOMATED: 67 MM/HR
SODIUM BLD-SCNC: 140 MMOL/L (ref 133–145)
SPECIFIC GRAVITY UA: 1.02 (ref 1–1.03)
TOTAL PROTEIN: 7 G/DL (ref 6.4–8.3)
UROBILINOGEN, URINE: 0.2 MG/DL
WBC # BLD: 5.1 K/UL (ref 4–11)

## 2024-09-13 ENCOUNTER — HOSPITAL ENCOUNTER (OUTPATIENT)
Facility: HOSPITAL | Age: 59
Discharge: HOME | End: 2024-09-16
Attending: ORTHOPAEDIC SURGERY
Payer: COMMERCIAL

## 2024-09-13 DIAGNOSIS — M17.11 ARTHRITIS OF KNEE, RIGHT: ICD-10-CM

## 2024-09-13 PROCEDURE — 73700 CT LOWER EXTREMITY W/O DYE: CPT

## 2024-10-01 RX ORDER — FENTANYL CITRATE 50 UG/ML
25 INJECTION, SOLUTION INTRAMUSCULAR; INTRAVENOUS EVERY 5 MIN PRN
Status: CANCELLED | OUTPATIENT
Start: 2024-10-01

## 2024-10-01 RX ORDER — SODIUM CHLORIDE 0.9 % (FLUSH) 0.9 %
5-40 SYRINGE (ML) INJECTION PRN
Status: CANCELLED | OUTPATIENT
Start: 2024-10-01

## 2024-10-01 RX ORDER — SODIUM CHLORIDE 0.9 % (FLUSH) 0.9 %
5-40 SYRINGE (ML) INJECTION EVERY 12 HOURS SCHEDULED
Status: CANCELLED | OUTPATIENT
Start: 2024-10-01

## 2024-10-01 RX ORDER — SODIUM CHLORIDE 9 MG/ML
INJECTION, SOLUTION INTRAVENOUS PRN
Status: CANCELLED | OUTPATIENT
Start: 2024-10-01

## 2024-10-01 RX ORDER — HYDROMORPHONE HYDROCHLORIDE 1 MG/ML
0.5 INJECTION, SOLUTION INTRAMUSCULAR; INTRAVENOUS; SUBCUTANEOUS EVERY 5 MIN PRN
Status: CANCELLED | OUTPATIENT
Start: 2024-10-01

## 2024-10-01 RX ORDER — NALOXONE HYDROCHLORIDE 0.4 MG/ML
INJECTION, SOLUTION INTRAMUSCULAR; INTRAVENOUS; SUBCUTANEOUS PRN
Status: CANCELLED | OUTPATIENT
Start: 2024-10-01

## 2024-10-02 ENCOUNTER — HOSPITAL ENCOUNTER (OUTPATIENT)
Facility: HOSPITAL | Age: 59
Setting detail: OUTPATIENT SURGERY
Discharge: HOME | End: 2024-10-02
Attending: ORTHOPAEDIC SURGERY | Admitting: ORTHOPAEDIC SURGERY
Payer: COMMERCIAL

## 2024-10-02 VITALS
BODY MASS INDEX: 36.45 KG/M2 | WEIGHT: 315 LBS | RESPIRATION RATE: 16 BRPM | OXYGEN SATURATION: 100 % | HEIGHT: 78 IN | HEART RATE: 60 BPM | SYSTOLIC BLOOD PRESSURE: 150 MMHG | TEMPERATURE: 97.3 F | DIASTOLIC BLOOD PRESSURE: 96 MMHG

## 2024-10-02 DIAGNOSIS — Z96.651 S/P TKR (TOTAL KNEE REPLACEMENT) NOT USING CEMENT, RIGHT: Primary | ICD-10-CM

## 2024-10-02 LAB
ABO + RH BLD: NORMAL
BLOOD GROUP ANTIBODIES SERPL: NORMAL
GLUCOSE BLD STRIP.AUTO-MCNC: 86 MG/DL (ref 70–110)
SPECIMEN EXP DATE BLD: NORMAL

## 2024-10-02 PROCEDURE — 6370000000 HC RX 637 (ALT 250 FOR IP): Performed by: ANESTHESIOLOGY

## 2024-10-02 PROCEDURE — 86850 RBC ANTIBODY SCREEN: CPT

## 2024-10-02 PROCEDURE — 36415 COLL VENOUS BLD VENIPUNCTURE: CPT

## 2024-10-02 PROCEDURE — 86901 BLOOD TYPING SEROLOGIC RH(D): CPT

## 2024-10-02 PROCEDURE — 86900 BLOOD TYPING SEROLOGIC ABO: CPT

## 2024-10-02 PROCEDURE — 82962 GLUCOSE BLOOD TEST: CPT

## 2024-10-02 PROCEDURE — 2580000003 HC RX 258: Performed by: ANESTHESIOLOGY

## 2024-10-02 RX ORDER — OXYCODONE HYDROCHLORIDE 5 MG/1
5 TABLET ORAL EVERY 4 HOURS PRN
Status: CANCELLED | OUTPATIENT
Start: 2024-10-02

## 2024-10-02 RX ORDER — ONDANSETRON 4 MG/1
4 TABLET, ORALLY DISINTEGRATING ORAL EVERY 8 HOURS PRN
Status: CANCELLED | OUTPATIENT
Start: 2024-10-02

## 2024-10-02 RX ORDER — TRANEXAMIC ACID 10 MG/ML
1000 INJECTION, SOLUTION INTRAVENOUS ONCE
Status: DISCONTINUED | OUTPATIENT
Start: 2024-10-02 | End: 2024-10-02 | Stop reason: HOSPADM

## 2024-10-02 RX ORDER — ACETAMINOPHEN 325 MG/1
650 TABLET ORAL EVERY 6 HOURS
Status: CANCELLED | OUTPATIENT
Start: 2024-10-02

## 2024-10-02 RX ORDER — SODIUM CHLORIDE, SODIUM LACTATE, POTASSIUM CHLORIDE, CALCIUM CHLORIDE 600; 310; 30; 20 MG/100ML; MG/100ML; MG/100ML; MG/100ML
INJECTION, SOLUTION INTRAVENOUS CONTINUOUS
Status: DISCONTINUED | OUTPATIENT
Start: 2024-10-02 | End: 2024-10-02 | Stop reason: HOSPADM

## 2024-10-02 RX ORDER — SODIUM CHLORIDE 9 MG/ML
INJECTION, SOLUTION INTRAVENOUS PRN
Status: CANCELLED | OUTPATIENT
Start: 2024-10-02

## 2024-10-02 RX ORDER — SODIUM CHLORIDE, SODIUM LACTATE, POTASSIUM CHLORIDE, CALCIUM CHLORIDE 600; 310; 30; 20 MG/100ML; MG/100ML; MG/100ML; MG/100ML
INJECTION, SOLUTION INTRAVENOUS CONTINUOUS
Status: CANCELLED | OUTPATIENT
Start: 2024-10-02

## 2024-10-02 RX ORDER — OXYCODONE AND ACETAMINOPHEN 5; 325 MG/1; MG/1
1 TABLET ORAL EVERY 6 HOURS PRN
Qty: 28 TABLET | Refills: 0 | Status: SHIPPED | OUTPATIENT
Start: 2024-10-02 | End: 2024-10-16

## 2024-10-02 RX ORDER — SODIUM CHLORIDE 0.9 % (FLUSH) 0.9 %
5-40 SYRINGE (ML) INJECTION EVERY 12 HOURS SCHEDULED
Status: CANCELLED | OUTPATIENT
Start: 2024-10-02

## 2024-10-02 RX ORDER — ASPIRIN/CALCIUM/MAG/ALUMINUM 325 MG
1 TABLET ORAL 2 TIMES DAILY
Qty: 60 TABLET | Refills: 0 | Status: SHIPPED | OUTPATIENT
Start: 2024-10-02

## 2024-10-02 RX ORDER — METOPROLOL TARTRATE 25 MG/1
50 TABLET, FILM COATED ORAL
Status: COMPLETED | OUTPATIENT
Start: 2024-10-02 | End: 2024-10-02

## 2024-10-02 RX ORDER — ONDANSETRON 2 MG/ML
4 INJECTION INTRAMUSCULAR; INTRAVENOUS EVERY 6 HOURS PRN
Status: CANCELLED | OUTPATIENT
Start: 2024-10-02

## 2024-10-02 RX ORDER — MELOXICAM 7.5 MG/1
15 TABLET ORAL DAILY
Status: CANCELLED | OUTPATIENT
Start: 2024-10-02 | End: 2024-10-05

## 2024-10-02 RX ORDER — CHLORHEXIDINE GLUCONATE 40 MG/ML
SOLUTION TOPICAL DAILY PRN
Status: DISCONTINUED | OUTPATIENT
Start: 2024-10-02 | End: 2024-10-02 | Stop reason: HOSPADM

## 2024-10-02 RX ORDER — SODIUM CHLORIDE, SODIUM LACTATE, POTASSIUM CHLORIDE, AND CALCIUM CHLORIDE .6; .31; .03; .02 G/100ML; G/100ML; G/100ML; G/100ML
1000 INJECTION, SOLUTION INTRAVENOUS ONCE
Status: COMPLETED | OUTPATIENT
Start: 2024-10-02 | End: 2024-10-02

## 2024-10-02 RX ORDER — MELOXICAM 15 MG/1
15 TABLET ORAL DAILY
Qty: 30 TABLET | Refills: 3 | Status: SHIPPED | OUTPATIENT
Start: 2024-10-02

## 2024-10-02 RX ORDER — SODIUM CHLORIDE 0.9 % (FLUSH) 0.9 %
5-40 SYRINGE (ML) INJECTION PRN
Status: CANCELLED | OUTPATIENT
Start: 2024-10-02

## 2024-10-02 RX ADMIN — METOPROLOL TARTRATE 50 MG: 25 TABLET, FILM COATED ORAL at 09:25

## 2024-10-02 RX ADMIN — SODIUM CHLORIDE, POTASSIUM CHLORIDE, SODIUM LACTATE AND CALCIUM CHLORIDE 1000 ML: 600; 310; 30; 20 INJECTION, SOLUTION INTRAVENOUS at 09:16

## 2024-10-02 ASSESSMENT — PROMIS GLOBAL HEALTH SCALE
IN GENERAL, HOW WOULD YOU RATE YOUR PHYSICAL HEALTH [ON A SCALE OF 1 (POOR) TO 5 (EXCELLENT)]?: FAIR
WHO IS THE PERSON COMPLETING THE PROMIS V1.1 SURVEY?: SELF
IN GENERAL, HOW WOULD YOU RATE YOUR SATISFACTION WITH YOUR SOCIAL ACTIVITIES AND RELATIONSHIPS [ON A SCALE OF 1 (POOR) TO 5 (EXCELLENT)]?: FAIR
IN GENERAL, HOW WOULD YOU RATE YOUR MENTAL HEALTH, INCLUDING YOUR MOOD AND YOUR ABILITY TO THINK [ON A SCALE OF 1 (POOR) TO 5 (EXCELLENT)]?: FAIR
IN THE PAST 7 DAYS, HOW WOULD YOU RATE YOUR PAIN ON AVERAGE [ON A SCALE FROM 0 (NO PAIN) TO 10 (WORST IMAGINABLE PAIN)]?: 6
IN GENERAL, WOULD YOU SAY YOUR QUALITY OF LIFE IS...[ON A SCALE OF 1 (POOR) TO 5 (EXCELLENT)]: GOOD
IN GENERAL, WOULD YOU SAY YOUR HEALTH IS...[ON A SCALE OF 1 (POOR) TO 5 (EXCELLENT)]: GOOD
SUM OF RESPONSES TO QUESTIONS 3, 6, 7, & 8: 14
IN GENERAL, PLEASE RATE HOW WELL YOU CARRY OUT YOUR USUAL SOCIAL ACTIVITIES (INCLUDES ACTIVITIES AT HOME, AT WORK, AND IN YOUR COMMUNITY, AND RESPONSIBILITIES AS A PARENT, CHILD, SPOUSE, EMPLOYEE, FRIEND, ETC) [ON A SCALE OF 1 (POOR) TO 5 (EXCELLENT)]?: GOOD
TO WHAT EXTENT ARE YOU ABLE TO CARRY OUT YOUR EVERYDAY PHYSICAL ACTIVITIES SUCH AS WALKING, CLIMBING STAIRS, CARRYING GROCERIES, OR MOVING A CHAIR [ON A SCALE OF 1 (NOT AT ALL) TO 5 (COMPLETELY)]?: MODERATELY
HOW IS THE PROMIS V1.1 BEING ADMINISTERED?: PAPER
IN THE PAST 7 DAYS, HOW OFTEN HAVE YOU BEEN BOTHERED BY EMOTIONAL PROBLEMS, SUCH AS FEELING ANXIOUS, DEPRESSED, OR IRRITABLE [ON A SCALE FROM 1 (NEVER) TO 5 (ALWAYS)]?: SOMETIMES
SUM OF RESPONSES TO QUESTIONS 2, 4, 5, & 10: 10
IN THE PAST 7 DAYS, HOW WOULD YOU RATE YOUR FATIGUE ON AVERAGE [ON A SCALE FROM 1 (NONE) TO 5 (VERY SEVERE)]?: MODERATE

## 2024-10-02 ASSESSMENT — PAIN DESCRIPTION - DESCRIPTORS: DESCRIPTORS: THROBBING

## 2024-10-02 ASSESSMENT — KOOS JR
STANDING UPRIGHT: MODERATE
RISING FROM SITTING: MODERATE
TWISING OR PIVOTING ON KNEE: MODERATE
GOING UP OR DOWN STAIRS: MODERATE
HOW SEVERE IS YOUR KNEE STIFFNESS AFTER FIRST WAKING IN MORNING: MODERATE
KOOS JR TOTAL INTERVAL SCORE: 52.465
STRAIGHTENING KNEE FULLY: MODERATE
BENDING TO THE FLOOR TO PICK UP OBJECT: MODERATE

## 2024-10-02 ASSESSMENT — PAIN - FUNCTIONAL ASSESSMENT: PAIN_FUNCTIONAL_ASSESSMENT: 0-10

## 2024-10-02 NOTE — H&P
every morning    Provider, MD Chivo   omeprazole (PRILOSEC) 40 MG delayed release capsule Take 1 capsule by mouth daily 30 min before breakfast on an empty stomach  Patient taking differently: Take 1 capsule by mouth every morning 30 min before breakfast on an empty stomach 12/29/23 12/23/24  Hyacinth Coyne APRN - NP   cyanocobalamin 1000 MCG/ML injection Inject 1 mL into the muscle every 30 days  Patient taking differently: Inject 1 mL into the muscle every 30 days Indications: Last dose Sept 1 12/29/23   Hyacinth Coyne APRN - NP   Syringe, Disposable, 3 ML MISC Use with B12 injection once monthly 4/14/23   Hyacinth Coyne APRN - NP   NEEDLE, DISP, 22 G (BD DISP NEEDLES) 22G X 1-1/2\" MISC Use with B12 injection once monthly 4/14/23   Hyacinth Coyne APRN - NP   allopurinol (ZYLOPRIM) 300 MG tablet Take 1 tablet by mouth every morning    Automatic Reconciliation, Ar   amLODIPine (NORVASC) 10 MG tablet Take 1 tablet by mouth every morning ceived the following from [facility]: Outside name: amLODIPine (NORVASC) 10 mg tablet 6/3/15   Automatic Reconciliation, Ar   ascorbic acid (VITAMIN C) 500 MG tablet Take 1 tablet by mouth every morning    Automatic Reconciliation, Ar   Calcium Carb-Cholecalciferol 600-20 MG-MCG CHEW Take by mouth    Automatic Reconciliation, Ar   Cholecalciferol 50 MCG (2000 UT) TABS Take by mouth    Automatic Reconciliation, Ar   ferrous sulfate (IRON 325) 325 (65 Fe) MG tablet Take 1 tablet by mouth every morning (before breakfast)    Automatic Reconciliation, Ar   losartan (COZAAR) 100 MG tablet Take 1 tablet by mouth every morning    Automatic Reconciliation, Ar   metoprolol tartrate (LOPRESSOR) 50 MG tablet Take 1 tablet by mouth every morning    Automatic Reconciliation, Ar     Family History   Problem Relation Age of Onset    Hypertension Mother     Heart Disease Father        Allergies: No Known Allergies     Review of Systems:  A comprehensive

## 2024-10-02 NOTE — DISCHARGE INSTRUCTIONS
Total Knee Arthroplasty Discharge Instructions   Kirby Castellanos M.D.      Please take the time to review the following instructions before you leave the hospital and use them as guidelines during your recovery from surgery. If you have any questions you may contact my office at [phone].     Wound Care / Dressing Changes:     Two days after your surgery date you should remove your dressing. A big, bulky dressing isn't necessary as long as there isn't any drainage from the incisions. If there is drainage you can put a band-aid, primapore, or mepilex dressing over the incision and change it daily until drainage stops. It isn't necessary to apply antibiotic ointment to your incisions. If you have glue over your incision do not peel it off.  If you have steri-strips over your incision they will start to peel off in 7-10 days. They don't need to be removed prior to that. When they begin to peel off you can remove them. They should all be removed by 14 days from you surgery. Keep a towel or gauze in any skin folds that may hang over the incision so that it stays dry.    Showering / Bathing:     You may only shower. You may shower if there is no drainage from your incisions. Your dressing may be removed for showering. You may get your incisions wet in the shower. Don't vigorously scrub the area where your incisions are. Apply a clean, dry dressing after drying off the area of your incisions. Don't take a tub bath, get in a swimming pool or Jacuzzi until the incisions are completely healed. Do not soak your incisions under water.     Weight Bearing Status / Braces:     __x___ Weight bearing as tolerated. Use crutches, walker, or cane as needed for support. You may move your joints as much as tolerated.     _____  \"Toe Touch\" weight bearing. Don't bear weight on the leg you had operated on. Use your toes only to steady yourself as you ambulate with crutches or a walker.     _____ Avoid extreme hip extension with  antidepressant medication you are taking. If you have any questions about possible interactions between your regular medications and the pain medication you should consult the physician who prescribes your regular medications.   Please take over the counter stool softeners while you are taking narcotic pain medication. Pain medications can cause constipation. Stool softeners, warm prune juice and increasing your water and fiber intake can help prevent constipation. Do not take laxatives.    Follow Up Appointment:    Please call [phone] for a follow appointment with Dr. Castellanos in 10-14 days from the time of your surgery. Please let our office know you are scheduling a post-op appointment.     Signs and Symptoms to be Aware of:     If any of the following signs and symptoms occur, you should contact Dr. Castellanos's office. Please be advised if a problem arises which you feel requires immediate medical attention or you are unable to contact Dr. Castellanos's office you should seek immediate medical attention at the emergency department or other health care facility you have access to.     Signs and symptoms to watch for include:     A sudden increase in swelling and or redness or warmth at the area your surgery was performed which isn't relieved by rest, ice and elevation.   Oral temperature greater than 101.5 degrees for 12 hours or more which isn't relieved by an increase in fluid intake and taking two Tylenol every 4-6 hours.   Excessive drainage from your incisions, or drainage which hasn't stopped by 72 hours after your surgery despite applying a compressive dressing, ice and elevation.   Calfpain, tenderness, redness or swelling which isn't relieved with rest and elevation.   Fever, chills, shortness ofbreath, chest pain, nausea, vomiting or other signs and symptoms which are of concern to you.     Other Instructions:

## 2024-10-02 NOTE — PERIOP NOTE
Reviewed PTA medication list with patient/caregiver and patient/caregiver denies any additional medications.     Patient admits to having a responsible adult care for them at home for at least 24 hours after surgery.    Patient encouraged to use gown warming system and informed that using said warming gown to regulate body temperature prior to a procedure has been shown to help reduce the risks of blood clots and infection.    Patient's pharmacy of choice verified and documented in PTA medication section.    Dual skin assessment & fall risk band verification completed with ANDRA Murphy.   Wound to left lower leg outer side- wound clinic performing drssing changed once a a week- Doctor Emanuel made aware.    SEE V/S- METOPROLOL LAST DOSE Monday- dOCTOR Marcus made aware- see order.

## 2024-10-02 NOTE — PERIOP NOTE
Dr. Castellanos in to see pt.  Observed wound to left leg. Risks explained to pt by Dr. Castellanos.  After discussion with family member, procedure was cancelled.  Pt instructed to follow up with Dr. Castellanos to be rescheduled.

## 2024-10-23 ENCOUNTER — APPOINTMENT (OUTPATIENT)
Age: 59
End: 2024-10-23
Payer: COMMERCIAL

## 2024-10-23 ENCOUNTER — HOSPITAL ENCOUNTER (EMERGENCY)
Age: 59
Discharge: HOME | End: 2024-10-23
Attending: EMERGENCY MEDICINE
Payer: COMMERCIAL

## 2024-10-23 VITALS
OXYGEN SATURATION: 96 % | HEIGHT: 78 IN | RESPIRATION RATE: 18 BRPM | TEMPERATURE: 98.1 F | DIASTOLIC BLOOD PRESSURE: 88 MMHG | BODY MASS INDEX: 36.45 KG/M2 | SYSTOLIC BLOOD PRESSURE: 148 MMHG | HEART RATE: 69 BPM | WEIGHT: 315 LBS

## 2024-10-23 DIAGNOSIS — S80.02XA CONTUSION OF LEFT KNEE, INITIAL ENCOUNTER: Primary | ICD-10-CM

## 2024-10-23 DIAGNOSIS — S52.125A CLOSED NONDISPLACED FRACTURE OF HEAD OF LEFT RADIUS, INITIAL ENCOUNTER: ICD-10-CM

## 2024-10-23 PROCEDURE — 73080 X-RAY EXAM OF ELBOW: CPT

## 2024-10-23 PROCEDURE — 99283 EMERGENCY DEPT VISIT LOW MDM: CPT

## 2024-10-23 PROCEDURE — 73562 X-RAY EXAM OF KNEE 3: CPT

## 2024-10-23 ASSESSMENT — PAIN DESCRIPTION - LOCATION: LOCATION: KNEE;ELBOW

## 2024-10-23 ASSESSMENT — PAIN DESCRIPTION - ORIENTATION: ORIENTATION: LEFT

## 2024-10-23 ASSESSMENT — PAIN SCALES - GENERAL: PAINLEVEL_OUTOF10: 8

## 2024-10-23 ASSESSMENT — PAIN - FUNCTIONAL ASSESSMENT: PAIN_FUNCTIONAL_ASSESSMENT: 0-10

## 2024-10-23 NOTE — ED PROVIDER NOTES
[facility] EMERGENCY DEPT  eMERGENCY dEPARTMENT eNCOUnter        Pt Name: Vijay Bryant  MRN: [id number]  Birthdate 1965  Date of evaluation: 10/23/2024  Provider: Puma Herndon MD  PCP: Yash Goldsmith MD  Note Started: 2:30 PM EDT 10/23/2024          CHIEF COMPLAINT       Chief Complaint   Patient presents with    Fall       HISTORY OF PRESENT ILLNESS        Patient work today when a simple trip and fall onto his left arm.  Complains of discomfort at the left elbow and the left knee.  Still able to ambulate despite the discomfort in the knee.  No head trauma of significance.  No neck pain.  No chest or abdominal symptoms.  No weakness, numbness or radicular symptoms.  No anticoagulants    Nursing Notes were all reviewed and agreed with or any disagreements were addressed  in the HPI.    REVIEW OF SYSTEMS         The following 10 systems are reviewed and negative except as noted in the HPI: Constitutional, Eyes, ENT, cardiovascular, pulmonary, GI, , neuro, skin, and musculoskeletal       PASTMEDICAL HISTORY     Past Medical History:   Diagnosis Date    Arthritis 1989    all over    Chronic pain 2009    both legs; managed by PCP    Depression 2024    under control at present.    Gout 2016    on med- managed by PCP    Hypertension 2004    on med; managed by PCP Dr Goldsmith    Sleep apnea 1989    cpap- no use any longer- denied issues with sleep apnea    Wound of ankle 2009    treatment of left ankle wound         SURGICAL HISTORY       Past Surgical History:   Procedure Laterality Date    COLONOSCOPY  2023    LAP GASTR BYPASS INCL SMLL INT RECON  12/02/2015    Dr. Migue Pritchard    ORTHOPEDIC SURGERY Left 2009    left knee replacement left ankle reconstruction    VASCULAR SURGERY Left 12/14/2017    ankle/vein sx    VASCULAR SURGERY Bilateral 2013    vein stripping         CURRENT MEDICATIONS       Previous Medications    ALLOPURINOL (ZYLOPRIM) 300 MG TABLET    Take 1 tablet by mouth every morning  normal range or not returned as of thisdictation.      IMAGING  XR ELBOW LEFT (MIN 3 VIEWS)   Final Result   1. Acute nondisplaced left radial neck fracture is suspected in the left elbow.   2. No acute bony abnormality of the left knee..      Electronically signed by MARIANNE TAYLOR      XR KNEE LEFT (3 VIEWS)   Final Result   1. Acute nondisplaced left radial neck fracture is suspected in the left elbow.   2. No acute bony abnormality of the left knee..      Electronically signed by MARIANNE TAYLOR        XR ELBOW LEFT (MIN 3 VIEWS)    Result Date: 10/23/2024  EXAM: XR KNEE LEFT (3 VIEWS), XR ELBOW LEFT (MIN 3 VIEWS) INDICATION: Left knee and left elbow pain after fall. COMPARISON: None. FINDINGS: Three views of the left knee and 3 views of the left elbow demonstrate a possible radial neck fracture without displacement. An elbow joint effusion is suspected but not well demonstrated due to nonstandard positioning. Generalized osteopenia and degenerative change. Total knee arthroplasty in the left knee with no complicating features. No knee joint effusion..     1. Acute nondisplaced left radial neck fracture is suspected in the left elbow. 2. No acute bony abnormality of the left knee.. Electronically signed by MARIANNE TAYLOR    XR KNEE LEFT (3 VIEWS)    Result Date: 10/23/2024  EXAM: XR KNEE LEFT (3 VIEWS), XR ELBOW LEFT (MIN 3 VIEWS) INDICATION: Left knee and left elbow pain after fall. COMPARISON: None. FINDINGS: Three views of the left knee and 3 views of the left elbow demonstrate a possible radial neck fracture without displacement. An elbow joint effusion is suspected but not well demonstrated due to nonstandard positioning. Generalized osteopenia and degenerative change. Total knee arthroplasty in the left knee with no complicating features. No knee joint effusion..     1. Acute nondisplaced left radial neck fracture is suspected in the left elbow. 2. No acute bony abnormality of the left knee..

## 2024-10-23 NOTE — DISCHARGE INSTRUCTIONS
Wear the sling for comfort.  Gentle range of motion exercises of the left elbow are fine.  No lifting with the left arm.  Take Tylenol, 650 mg every 6 hours as needed for pain.  Apply ice over the affected areas for 20 minutes several times over the course of the next 2 to 3 days.  Follow-up with orthopedics in the coming week.  Return as needed

## 2024-10-23 NOTE — ED TRIAGE NOTES
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 No OTC pain meds at home. Patient ambulatory to room 8, sitting up on side of bed, states he feels better sitting up on side of bed. States he is trying to get his right knee  fixed, now he has fell on his left knee, which he has already had done

## 2025-02-01 DIAGNOSIS — Z98.84 S/P GASTRIC BYPASS: ICD-10-CM

## 2025-02-01 DIAGNOSIS — E66.01 MORBID OBESITY: ICD-10-CM

## 2025-02-06 RX ORDER — CYANOCOBALAMIN 1000 UG/ML
1000 INJECTION, SOLUTION INTRAMUSCULAR; SUBCUTANEOUS
Qty: 3 ML | Refills: 0 | Status: SHIPPED | OUTPATIENT
Start: 2025-02-06

## 2025-02-21 ENCOUNTER — TELEMEDICINE (OUTPATIENT)
Age: 60
End: 2025-02-21
Payer: COMMERCIAL

## 2025-02-21 VITALS — BODY MASS INDEX: 37.19 KG/M2 | WEIGHT: 315 LBS | HEIGHT: 77 IN

## 2025-02-21 DIAGNOSIS — K91.2 POSTOPERATIVE INTESTINAL MALABSORPTION: Primary | ICD-10-CM

## 2025-02-21 DIAGNOSIS — R53.83 OTHER FATIGUE: ICD-10-CM

## 2025-02-21 DIAGNOSIS — E66.01 MORBID OBESITY: ICD-10-CM

## 2025-02-21 DIAGNOSIS — Z98.84 S/P GASTRIC BYPASS: ICD-10-CM

## 2025-02-21 PROCEDURE — 99214 OFFICE O/P EST MOD 30 MIN: CPT | Performed by: NURSE PRACTITIONER

## 2025-02-21 RX ORDER — CYANOCOBALAMIN 1000 UG/ML
1000 INJECTION, SOLUTION INTRAMUSCULAR; SUBCUTANEOUS
Qty: 3 ML | Refills: 3 | Status: SHIPPED | OUTPATIENT
Start: 2025-02-21

## 2025-02-21 NOTE — PROGRESS NOTES
Chief Complaint   Patient presents with    Weight Management    Follow-up    1. Have you been to the ER, urgent care clinic since your last visit?  Hospitalized since your last visit?No    2. Have you seen or consulted any other health care providers outside of the [facility] since your last visit?  Include any pap smears or colon screening. No      Bariatric Postoperative Nurse Note      Vijay Bryant is a 60 y.o. male status post laparoscopic gastric bypass surgery performed on 2015.    All Post-Ops (including two weeks)  -# of grams of protein daily? 90  -sources of protein? shakes  -# of oz of sugar free fluids from all sources daily?  128oz  -Nausea? No  -Vomiting? No  -Difficulty swallow/food sticking? No  -Heartburn/regurgitation? No  -Character of bowel movements (diarrhea/constipation/bloody stools?) regular  -Which multivitamin product are you taking? Bariatric Pal   -What dose and how frequently are you taking calcium citrate? 3 times a day  - from any iron-containing multivitamin by 2 hours? Yes  -Ulcer risk exposures:   NSAID No  Tobacco No  Alcohol No  Steroids No  -Minutes of physical activity and what type? none

## 2025-02-21 NOTE — PROGRESS NOTES
Vijay Bryant, was evaluated through a synchronous (real-time) audio-video encounter. The patient (or guardian if applicable) is aware that this is a billable service, which includes applicable co-pays. This Virtual Visit was conducted with patient's (and/or legal guardian's) consent. Patient identification was verified, and a caregiver was present when appropriate.   The patient was located at Home: [address] [place] [id number]  Provider was located at Facility (Appt Dept): [address] [place] [address]  [address]  [place] [address]  Confirm you are appropriately licensed, registered, or certified to deliver care in the state where the patient is located as indicated above. If you are not or unsure, please re-schedule the visit: Yes, I confirm.      Total time spent for this encounter: Not billed by time    --PK Peraza - NP on 2/24/2025 at 3:23 PM    An electronic signature was used to authenticate this note.       Bariatric Postoperative Progress Note    Chief Complaint   Patient presents with    Follow-up     LGBP 2015       History of Present Illness  The patient is a 60 year old male who presents via virtual visit for annual bariatric surgery follow up. He is status post laparoscopic gastric bypass surgery performed in 2015.    He is currently preparing for a total knee replacement surgery, which was previously scheduled but postponed due to a minor wound on his left ankle. The wound has since healed significantly, with a 90 percent closure rate reported by the wound care team in [place]. He has been advised to contact the surgeon to reschedule the procedure once the wound is fully closed.    He has experienced some weight loss since our last visit in 12/2023, with his current weight around 315 pounds. He maintains adequate hydration and is compliant with his bariatric vitamin regimen. 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

## 2025-02-24 DIAGNOSIS — R53.83 OTHER FATIGUE: ICD-10-CM

## 2025-02-24 DIAGNOSIS — Z98.84 S/P GASTRIC BYPASS: ICD-10-CM

## 2025-02-24 DIAGNOSIS — K91.2 POSTOPERATIVE INTESTINAL MALABSORPTION: ICD-10-CM

## 2025-02-24 DIAGNOSIS — E66.01 MORBID OBESITY: ICD-10-CM

## 2025-06-08 DIAGNOSIS — Z98.84 S/P GASTRIC BYPASS: ICD-10-CM

## 2025-06-08 DIAGNOSIS — R53.83 OTHER FATIGUE: ICD-10-CM

## 2025-06-08 DIAGNOSIS — E66.01 MORBID OBESITY (HCC): ICD-10-CM

## 2025-06-11 RX ORDER — CYANOCOBALAMIN 1000 UG/ML
1000 INJECTION, SOLUTION INTRAMUSCULAR; SUBCUTANEOUS
Qty: 3 ML | Refills: 0 | Status: SHIPPED | OUTPATIENT
Start: 2025-06-11

## 2025-07-11 ENCOUNTER — HOSPITAL ENCOUNTER (OUTPATIENT)
Facility: HOSPITAL | Age: 60
Discharge: HOME | End: 2025-07-14
Payer: COMMERCIAL

## 2025-07-11 ENCOUNTER — HOSPITAL ENCOUNTER (OUTPATIENT)
Facility: HOSPITAL | Age: 60
Setting detail: SPECIMEN
Discharge: HOME | End: 2025-07-14

## 2025-07-11 DIAGNOSIS — M17.11 OSTEOARTHRITIS OF RIGHT KNEE, UNSPECIFIED OSTEOARTHRITIS TYPE: ICD-10-CM

## 2025-07-11 LAB — SENTARA SPECIMEN COLLECTION: NORMAL

## 2025-07-11 PROCEDURE — 93005 ELECTROCARDIOGRAM TRACING: CPT

## 2025-07-11 PROCEDURE — 99001 SPECIMEN HANDLING PT-LAB: CPT

## 2025-07-14 LAB
EKG ATRIAL RATE: 65 BPM
EKG DIAGNOSIS: NORMAL
EKG P AXIS: 52 DEGREES
EKG P-R INTERVAL: 150 MS
EKG Q-T INTERVAL: 408 MS
EKG QRS DURATION: 90 MS
EKG QTC CALCULATION (BAZETT): 424 MS
EKG R AXIS: -13 DEGREES
EKG T AXIS: 24 DEGREES
EKG VENTRICULAR RATE: 65 BPM

## 2025-07-14 PROCEDURE — 93010 ELECTROCARDIOGRAM REPORT: CPT | Performed by: INTERNAL MEDICINE

## 2025-08-08 ENCOUNTER — ANESTHESIA EVENT (OUTPATIENT)
Facility: HOSPITAL | Age: 60
End: 2025-08-08
Payer: COMMERCIAL

## 2025-08-20 ENCOUNTER — ANESTHESIA (OUTPATIENT)
Facility: HOSPITAL | Age: 60
End: 2025-08-20
Payer: COMMERCIAL

## 2025-08-20 ENCOUNTER — HOSPITAL ENCOUNTER (OUTPATIENT)
Facility: HOSPITAL | Age: 60
Setting detail: OUTPATIENT SURGERY
Discharge: HOME | End: 2025-08-20
Attending: ORTHOPAEDIC SURGERY | Admitting: ORTHOPAEDIC SURGERY
Payer: COMMERCIAL

## 2025-08-20 ENCOUNTER — APPOINTMENT (OUTPATIENT)
Facility: HOSPITAL | Age: 60
End: 2025-08-20
Attending: ORTHOPAEDIC SURGERY
Payer: COMMERCIAL

## 2025-08-20 VITALS
SYSTOLIC BLOOD PRESSURE: 137 MMHG | BODY MASS INDEX: 37.19 KG/M2 | WEIGHT: 315 LBS | RESPIRATION RATE: 16 BRPM | DIASTOLIC BLOOD PRESSURE: 71 MMHG | HEIGHT: 77 IN | OXYGEN SATURATION: 99 % | HEART RATE: 59 BPM | TEMPERATURE: 97.6 F

## 2025-08-20 DIAGNOSIS — Z96.651 S/P TKR (TOTAL KNEE REPLACEMENT) NOT USING CEMENT, RIGHT: Primary | ICD-10-CM

## 2025-08-20 LAB
ABO + RH BLD: NORMAL
BLOOD GROUP ANTIBODIES SERPL: NORMAL
GLUCOSE BLD STRIP.AUTO-MCNC: 99 MG/DL (ref 70–110)
SPECIMEN EXP DATE BLD: NORMAL

## 2025-08-20 PROCEDURE — 97161 PT EVAL LOW COMPLEX 20 MIN: CPT

## 2025-08-20 PROCEDURE — 7100000000 HC PACU RECOVERY - FIRST 15 MIN: Performed by: ORTHOPAEDIC SURGERY

## 2025-08-20 PROCEDURE — 6360000002 HC RX W HCPCS: Performed by: ORTHOPAEDIC SURGERY

## 2025-08-20 PROCEDURE — 82962 GLUCOSE BLOOD TEST: CPT

## 2025-08-20 PROCEDURE — 6360000002 HC RX W HCPCS

## 2025-08-20 PROCEDURE — 97116 GAIT TRAINING THERAPY: CPT

## 2025-08-20 PROCEDURE — 3700000000 HC ANESTHESIA ATTENDED CARE: Performed by: ORTHOPAEDIC SURGERY

## 2025-08-20 PROCEDURE — 6360000002 HC RX W HCPCS: Performed by: SPECIALIST

## 2025-08-20 PROCEDURE — 2500000003 HC RX 250 WO HCPCS

## 2025-08-20 PROCEDURE — 2500000003 HC RX 250 WO HCPCS: Performed by: ORTHOPAEDIC SURGERY

## 2025-08-20 PROCEDURE — 2720000010 HC SURG SUPPLY STERILE: Performed by: ORTHOPAEDIC SURGERY

## 2025-08-20 PROCEDURE — 7100000010 HC PHASE II RECOVERY - FIRST 15 MIN: Performed by: ORTHOPAEDIC SURGERY

## 2025-08-20 PROCEDURE — 36415 COLL VENOUS BLD VENIPUNCTURE: CPT

## 2025-08-20 PROCEDURE — 97530 THERAPEUTIC ACTIVITIES: CPT

## 2025-08-20 PROCEDURE — 2580000003 HC RX 258: Performed by: ORTHOPAEDIC SURGERY

## 2025-08-20 PROCEDURE — 3700000001 HC ADD 15 MINUTES (ANESTHESIA): Performed by: ORTHOPAEDIC SURGERY

## 2025-08-20 PROCEDURE — 86900 BLOOD TYPING SEROLOGIC ABO: CPT

## 2025-08-20 PROCEDURE — 3600000003 HC SURGERY LEVEL 3 BASE: Performed by: ORTHOPAEDIC SURGERY

## 2025-08-20 PROCEDURE — 3600000013 HC SURGERY LEVEL 3 ADDTL 15MIN: Performed by: ORTHOPAEDIC SURGERY

## 2025-08-20 PROCEDURE — 6360000002 HC RX W HCPCS: Performed by: NURSE ANESTHETIST, CERTIFIED REGISTERED

## 2025-08-20 PROCEDURE — 86901 BLOOD TYPING SEROLOGIC RH(D): CPT

## 2025-08-20 PROCEDURE — 7100000001 HC PACU RECOVERY - ADDTL 15 MIN: Performed by: ORTHOPAEDIC SURGERY

## 2025-08-20 PROCEDURE — 6370000000 HC RX 637 (ALT 250 FOR IP): Performed by: SPECIALIST

## 2025-08-20 PROCEDURE — 2709999900 HC NON-CHARGEABLE SUPPLY: Performed by: ORTHOPAEDIC SURGERY

## 2025-08-20 PROCEDURE — C1776 JOINT DEVICE (IMPLANTABLE): HCPCS | Performed by: ORTHOPAEDIC SURGERY

## 2025-08-20 PROCEDURE — 2500000003 HC RX 250 WO HCPCS: Performed by: SPECIALIST

## 2025-08-20 PROCEDURE — 73560 X-RAY EXAM OF KNEE 1 OR 2: CPT

## 2025-08-20 PROCEDURE — 64447 NJX AA&/STRD FEMORAL NRV IMG: CPT | Performed by: SPECIALIST

## 2025-08-20 PROCEDURE — 86850 RBC ANTIBODY SCREEN: CPT

## 2025-08-20 PROCEDURE — 7100000011 HC PHASE II RECOVERY - ADDTL 15 MIN: Performed by: ORTHOPAEDIC SURGERY

## 2025-08-20 DEVICE — BASEPLATE TIB SZ 7 AP56MM ML80MM KNEE TRITANIUM 4 CRUCFRM: Type: IMPLANTABLE DEVICE | Site: KNEE | Status: FUNCTIONAL

## 2025-08-20 DEVICE — IMPLANTABLE DEVICE: Type: IMPLANTABLE DEVICE | Site: KNEE | Status: FUNCTIONAL

## 2025-08-20 DEVICE — COMPONENT PAT SZ A40 W44MM DIA40MM THK11MM MED LAT KNEE: Type: IMPLANTABLE DEVICE | Site: KNEE | Status: FUNCTIONAL

## 2025-08-20 RX ORDER — OXYCODONE HYDROCHLORIDE 5 MG/1
5 TABLET ORAL EVERY 4 HOURS PRN
Status: DISCONTINUED | OUTPATIENT
Start: 2025-08-20 | End: 2025-08-20 | Stop reason: HOSPADM

## 2025-08-20 RX ORDER — SODIUM CHLORIDE 9 MG/ML
INJECTION, SOLUTION INTRAVENOUS PRN
Status: CANCELLED | OUTPATIENT
Start: 2025-08-20

## 2025-08-20 RX ORDER — ROPIVACAINE HYDROCHLORIDE 5 MG/ML
INJECTION, SOLUTION EPIDURAL; INFILTRATION; PERINEURAL
Status: COMPLETED | OUTPATIENT
Start: 2025-08-20 | End: 2025-08-20

## 2025-08-20 RX ORDER — SODIUM CHLORIDE, SODIUM LACTATE, POTASSIUM CHLORIDE, CALCIUM CHLORIDE 600; 310; 30; 20 MG/100ML; MG/100ML; MG/100ML; MG/100ML
INJECTION, SOLUTION INTRAVENOUS CONTINUOUS
Status: DISCONTINUED | OUTPATIENT
Start: 2025-08-20 | End: 2025-08-20 | Stop reason: HOSPADM

## 2025-08-20 RX ORDER — ACETAMINOPHEN 500 MG
1000 TABLET ORAL ONCE
Status: COMPLETED | OUTPATIENT
Start: 2025-08-20 | End: 2025-08-20

## 2025-08-20 RX ORDER — GLYCOPYRROLATE 0.2 MG/ML
INJECTION INTRAMUSCULAR; INTRAVENOUS
Status: DISCONTINUED | OUTPATIENT
Start: 2025-08-20 | End: 2025-08-20 | Stop reason: SDUPTHER

## 2025-08-20 RX ORDER — TRANEXAMIC ACID 10 MG/ML
1000 INJECTION, SOLUTION INTRAVENOUS
Status: COMPLETED | OUTPATIENT
Start: 2025-08-20 | End: 2025-08-20

## 2025-08-20 RX ORDER — SODIUM CHLORIDE 0.9 % (FLUSH) 0.9 %
5-40 SYRINGE (ML) INJECTION EVERY 12 HOURS SCHEDULED
Status: DISCONTINUED | OUTPATIENT
Start: 2025-08-20 | End: 2025-08-20 | Stop reason: HOSPADM

## 2025-08-20 RX ORDER — PROPOFOL 10 MG/ML
INJECTION, EMULSION INTRAVENOUS
Status: DISCONTINUED | OUTPATIENT
Start: 2025-08-20 | End: 2025-08-20 | Stop reason: SDUPTHER

## 2025-08-20 RX ORDER — FENTANYL CITRATE 50 UG/ML
25 INJECTION, SOLUTION INTRAMUSCULAR; INTRAVENOUS EVERY 5 MIN PRN
Status: DISCONTINUED | OUTPATIENT
Start: 2025-08-20 | End: 2025-08-20 | Stop reason: HOSPADM

## 2025-08-20 RX ORDER — SODIUM CHLORIDE 0.9 % (FLUSH) 0.9 %
5-40 SYRINGE (ML) INJECTION PRN
Status: CANCELLED | OUTPATIENT
Start: 2025-08-20

## 2025-08-20 RX ORDER — SODIUM CHLORIDE 0.9 % (FLUSH) 0.9 %
5-40 SYRINGE (ML) INJECTION PRN
Status: DISCONTINUED | OUTPATIENT
Start: 2025-08-20 | End: 2025-08-20 | Stop reason: HOSPADM

## 2025-08-20 RX ORDER — SODIUM CHLORIDE, SODIUM LACTATE, POTASSIUM CHLORIDE, CALCIUM CHLORIDE 600; 310; 30; 20 MG/100ML; MG/100ML; MG/100ML; MG/100ML
INJECTION, SOLUTION INTRAVENOUS CONTINUOUS
Status: CANCELLED | OUTPATIENT
Start: 2025-08-20

## 2025-08-20 RX ORDER — ONDANSETRON 4 MG/1
4 TABLET, ORALLY DISINTEGRATING ORAL EVERY 8 HOURS PRN
Status: DISCONTINUED | OUTPATIENT
Start: 2025-08-20 | End: 2025-08-20 | Stop reason: HOSPADM

## 2025-08-20 RX ORDER — EPHEDRINE SULFATE 5 MG/ML
INJECTION INTRAVENOUS
Status: DISCONTINUED | OUTPATIENT
Start: 2025-08-20 | End: 2025-08-20 | Stop reason: SDUPTHER

## 2025-08-20 RX ORDER — ONDANSETRON 2 MG/ML
4 INJECTION INTRAMUSCULAR; INTRAVENOUS
Status: DISCONTINUED | OUTPATIENT
Start: 2025-08-20 | End: 2025-08-20 | Stop reason: HOSPADM

## 2025-08-20 RX ORDER — ONDANSETRON 2 MG/ML
4 INJECTION INTRAMUSCULAR; INTRAVENOUS EVERY 6 HOURS PRN
Status: DISCONTINUED | OUTPATIENT
Start: 2025-08-20 | End: 2025-08-20 | Stop reason: HOSPADM

## 2025-08-20 RX ORDER — MAGNESIUM HYDROXIDE 1200 MG/15ML
LIQUID ORAL CONTINUOUS PRN
Status: COMPLETED | OUTPATIENT
Start: 2025-08-20 | End: 2025-08-20

## 2025-08-20 RX ORDER — ACETAMINOPHEN 325 MG/1
650 TABLET ORAL EVERY 6 HOURS
Status: CANCELLED | OUTPATIENT
Start: 2025-08-20

## 2025-08-20 RX ORDER — ONDANSETRON 2 MG/ML
INJECTION INTRAMUSCULAR; INTRAVENOUS
Status: DISCONTINUED | OUTPATIENT
Start: 2025-08-20 | End: 2025-08-20 | Stop reason: SDUPTHER

## 2025-08-20 RX ORDER — SODIUM CHLORIDE 9 MG/ML
INJECTION, SOLUTION INTRAVENOUS PRN
Status: DISCONTINUED | OUTPATIENT
Start: 2025-08-20 | End: 2025-08-20 | Stop reason: HOSPADM

## 2025-08-20 RX ORDER — SODIUM CHLORIDE 0.9 % (FLUSH) 0.9 %
5-40 SYRINGE (ML) INJECTION EVERY 12 HOURS SCHEDULED
Status: CANCELLED | OUTPATIENT
Start: 2025-08-20

## 2025-08-20 RX ORDER — DROPERIDOL 2.5 MG/ML
0.62 INJECTION, SOLUTION INTRAMUSCULAR; INTRAVENOUS
Status: DISCONTINUED | OUTPATIENT
Start: 2025-08-20 | End: 2025-08-20 | Stop reason: HOSPADM

## 2025-08-20 RX ORDER — MIDAZOLAM HYDROCHLORIDE 1 MG/ML
INJECTION, SOLUTION INTRAMUSCULAR; INTRAVENOUS
Status: COMPLETED
Start: 2025-08-20 | End: 2025-08-20

## 2025-08-20 RX ORDER — HYDROMORPHONE HYDROCHLORIDE 1 MG/ML
0.5 INJECTION, SOLUTION INTRAMUSCULAR; INTRAVENOUS; SUBCUTANEOUS EVERY 5 MIN PRN
Status: COMPLETED | OUTPATIENT
Start: 2025-08-20 | End: 2025-08-20

## 2025-08-20 RX ORDER — MIDAZOLAM HYDROCHLORIDE 1 MG/ML
INJECTION, SOLUTION INTRAMUSCULAR; INTRAVENOUS
Status: COMPLETED | OUTPATIENT
Start: 2025-08-20 | End: 2025-08-20

## 2025-08-20 RX ORDER — DEXMEDETOMIDINE HYDROCHLORIDE 100 UG/ML
INJECTION, SOLUTION INTRAVENOUS
Status: DISCONTINUED | OUTPATIENT
Start: 2025-08-20 | End: 2025-08-20 | Stop reason: SDUPTHER

## 2025-08-20 RX ORDER — ASPIRIN/CALCIUM/MAG/ALUMINUM 325 MG
1 TABLET ORAL 2 TIMES DAILY
Qty: 60 TABLET | Refills: 0 | Status: SHIPPED | OUTPATIENT
Start: 2025-08-20

## 2025-08-20 RX ORDER — DIPHENHYDRAMINE HYDROCHLORIDE 50 MG/ML
12.5 INJECTION, SOLUTION INTRAMUSCULAR; INTRAVENOUS
Status: DISCONTINUED | OUTPATIENT
Start: 2025-08-20 | End: 2025-08-20 | Stop reason: HOSPADM

## 2025-08-20 RX ORDER — OXYCODONE AND ACETAMINOPHEN 5; 325 MG/1; MG/1
1 TABLET ORAL EVERY 6 HOURS PRN
Qty: 28 TABLET | Refills: 0 | Status: SHIPPED | OUTPATIENT
Start: 2025-08-20 | End: 2025-09-03

## 2025-08-20 RX ORDER — DEXMEDETOMIDINE HYDROCHLORIDE 100 UG/ML
INJECTION, SOLUTION INTRAVENOUS
Status: COMPLETED | OUTPATIENT
Start: 2025-08-20 | End: 2025-08-20

## 2025-08-20 RX ORDER — LABETALOL HYDROCHLORIDE 5 MG/ML
10 INJECTION, SOLUTION INTRAVENOUS
Status: DISCONTINUED | OUTPATIENT
Start: 2025-08-20 | End: 2025-08-20 | Stop reason: HOSPADM

## 2025-08-20 RX ORDER — MORPHINE SULFATE 4 MG/ML
INJECTION, SOLUTION INTRAMUSCULAR; INTRAVENOUS PRN
Status: DISCONTINUED | OUTPATIENT
Start: 2025-08-20 | End: 2025-08-20 | Stop reason: ALTCHOICE

## 2025-08-20 RX ORDER — OXYCODONE HYDROCHLORIDE 5 MG/1
5 TABLET ORAL
Status: DISCONTINUED | OUTPATIENT
Start: 2025-08-20 | End: 2025-08-20 | Stop reason: HOSPADM

## 2025-08-20 RX ORDER — MELOXICAM 7.5 MG/1
15 TABLET ORAL DAILY
Status: CANCELLED | OUTPATIENT
Start: 2025-08-20 | End: 2025-08-23

## 2025-08-20 RX ORDER — LIDOCAINE HYDROCHLORIDE 20 MG/ML
INJECTION, SOLUTION EPIDURAL; INFILTRATION; INTRACAUDAL; PERINEURAL
Status: DISCONTINUED | OUTPATIENT
Start: 2025-08-20 | End: 2025-08-20 | Stop reason: SDUPTHER

## 2025-08-20 RX ADMIN — ONDANSETRON 4 MG: 2 INJECTION, SOLUTION INTRAMUSCULAR; INTRAVENOUS at 14:03

## 2025-08-20 RX ADMIN — DEXMEDETOMIDINE HYDROCHLORIDE 0.2 ML: 100 INJECTION, SOLUTION INTRAVENOUS at 10:54

## 2025-08-20 RX ADMIN — HYDROMORPHONE HYDROCHLORIDE 0.25 MG: 1 INJECTION, SOLUTION INTRAMUSCULAR; INTRAVENOUS; SUBCUTANEOUS at 13:20

## 2025-08-20 RX ADMIN — DEXMEDETOMIDINE HYDROCHLORIDE 4 MCG: 100 INJECTION, SOLUTION INTRAVENOUS at 13:17

## 2025-08-20 RX ADMIN — FENTANYL CITRATE 25 MCG: 50 INJECTION INTRAMUSCULAR; INTRAVENOUS at 15:11

## 2025-08-20 RX ADMIN — DEXMEDETOMIDINE HYDROCHLORIDE 4 MCG: 100 INJECTION, SOLUTION INTRAVENOUS at 13:10

## 2025-08-20 RX ADMIN — HYDROMORPHONE HYDROCHLORIDE 0.5 MG: 1 INJECTION, SOLUTION INTRAMUSCULAR; INTRAVENOUS; SUBCUTANEOUS at 14:55

## 2025-08-20 RX ADMIN — ROPIVACAINE HYDROCHLORIDE 20 ML: 5 INJECTION, SOLUTION EPIDURAL; INFILTRATION; PERINEURAL at 10:54

## 2025-08-20 RX ADMIN — EPHEDRINE SULFATE 10 MG: 5 INJECTION INTRAVENOUS at 14:08

## 2025-08-20 RX ADMIN — TRANEXAMIC ACID 1000 MG: 10 INJECTION, SOLUTION INTRAVENOUS at 12:11

## 2025-08-20 RX ADMIN — EPHEDRINE SULFATE 10 MG: 5 INJECTION INTRAVENOUS at 14:05

## 2025-08-20 RX ADMIN — EPHEDRINE SULFATE 5 MG: 5 INJECTION INTRAVENOUS at 13:05

## 2025-08-20 RX ADMIN — ACETAMINOPHEN 1000 MG: 500 TABLET ORAL at 10:12

## 2025-08-20 RX ADMIN — GLYCOPYRROLATE 0.2 MG: 0.2 INJECTION INTRAMUSCULAR; INTRAVENOUS at 12:07

## 2025-08-20 RX ADMIN — DEXMEDETOMIDINE HYDROCHLORIDE 4 MCG: 100 INJECTION, SOLUTION INTRAVENOUS at 12:14

## 2025-08-20 RX ADMIN — EPHEDRINE SULFATE 10 MG: 5 INJECTION INTRAVENOUS at 12:41

## 2025-08-20 RX ADMIN — HYDROMORPHONE HYDROCHLORIDE 0.5 MG: 1 INJECTION, SOLUTION INTRAMUSCULAR; INTRAVENOUS; SUBCUTANEOUS at 12:09

## 2025-08-20 RX ADMIN — LIDOCAINE HYDROCHLORIDE 100 MG: 20 INJECTION, SOLUTION EPIDURAL; INFILTRATION; INTRACAUDAL; PERINEURAL at 12:07

## 2025-08-20 RX ADMIN — HYDROMORPHONE HYDROCHLORIDE 0.5 MG: 1 INJECTION, SOLUTION INTRAMUSCULAR; INTRAVENOUS; SUBCUTANEOUS at 14:50

## 2025-08-20 RX ADMIN — MIDAZOLAM 2 MG: 1 INJECTION INTRAMUSCULAR; INTRAVENOUS at 10:49

## 2025-08-20 RX ADMIN — SODIUM CHLORIDE, SODIUM LACTATE, POTASSIUM CHLORIDE, AND CALCIUM CHLORIDE: 600; 310; 30; 20 INJECTION, SOLUTION INTRAVENOUS at 10:28

## 2025-08-20 RX ADMIN — DEXMEDETOMIDINE HYDROCHLORIDE 4 MCG: 100 INJECTION, SOLUTION INTRAVENOUS at 13:15

## 2025-08-20 RX ADMIN — HYDROMORPHONE HYDROCHLORIDE 0.25 MG: 1 INJECTION, SOLUTION INTRAMUSCULAR; INTRAVENOUS; SUBCUTANEOUS at 14:19

## 2025-08-20 RX ADMIN — DEXMEDETOMIDINE HYDROCHLORIDE 4 MCG: 100 INJECTION, SOLUTION INTRAVENOUS at 12:55

## 2025-08-20 RX ADMIN — HYDROMORPHONE HYDROCHLORIDE 0.5 MG: 1 INJECTION, SOLUTION INTRAMUSCULAR; INTRAVENOUS; SUBCUTANEOUS at 12:23

## 2025-08-20 RX ADMIN — Medication 3000 MG: at 12:10

## 2025-08-20 RX ADMIN — SODIUM CHLORIDE, SODIUM LACTATE, POTASSIUM CHLORIDE, AND CALCIUM CHLORIDE: 600; 310; 30; 20 INJECTION, SOLUTION INTRAVENOUS at 12:41

## 2025-08-20 RX ADMIN — TRANEXAMIC ACID 1000 MG: 10 INJECTION, SOLUTION INTRAVENOUS at 14:03

## 2025-08-20 RX ADMIN — FENTANYL CITRATE 25 MCG: 50 INJECTION INTRAMUSCULAR; INTRAVENOUS at 15:04

## 2025-08-20 RX ADMIN — PROPOFOL 100 MG: 10 INJECTION, EMULSION INTRAVENOUS at 12:29

## 2025-08-20 RX ADMIN — PROPOFOL 200 MG: 10 INJECTION, EMULSION INTRAVENOUS at 12:07

## 2025-08-20 RX ADMIN — CEFAZOLIN 3000 MG: 10 INJECTION, POWDER, FOR SOLUTION INTRAVENOUS at 16:17

## 2025-08-20 ASSESSMENT — PAIN DESCRIPTION - ORIENTATION
ORIENTATION: RIGHT

## 2025-08-20 ASSESSMENT — PAIN DESCRIPTION - LOCATION
LOCATION: KNEE

## 2025-08-20 ASSESSMENT — PAIN - FUNCTIONAL ASSESSMENT
PAIN_FUNCTIONAL_ASSESSMENT: PREVENTS OR INTERFERES SOME ACTIVE ACTIVITIES AND ADLS
PAIN_FUNCTIONAL_ASSESSMENT: ACTIVITIES ARE NOT PREVENTED
PAIN_FUNCTIONAL_ASSESSMENT: 0-10
PAIN_FUNCTIONAL_ASSESSMENT: ACTIVITIES ARE NOT PREVENTED
PAIN_FUNCTIONAL_ASSESSMENT: 0-10
PAIN_FUNCTIONAL_ASSESSMENT: ACTIVITIES ARE NOT PREVENTED
PAIN_FUNCTIONAL_ASSESSMENT: PREVENTS OR INTERFERES SOME ACTIVE ACTIVITIES AND ADLS
PAIN_FUNCTIONAL_ASSESSMENT: ACTIVITIES ARE NOT PREVENTED
PAIN_FUNCTIONAL_ASSESSMENT: PREVENTS OR INTERFERES SOME ACTIVE ACTIVITIES AND ADLS
PAIN_FUNCTIONAL_ASSESSMENT: PREVENTS OR INTERFERES SOME ACTIVE ACTIVITIES AND ADLS
PAIN_FUNCTIONAL_ASSESSMENT: 0-10
PAIN_FUNCTIONAL_ASSESSMENT: ACTIVITIES ARE NOT PREVENTED
PAIN_FUNCTIONAL_ASSESSMENT: 0-10

## 2025-08-20 ASSESSMENT — PAIN SCALES - GENERAL
PAINLEVEL_OUTOF10: 6
PAINLEVEL_OUTOF10: 8
PAINLEVEL_OUTOF10: 8
PAINLEVEL_OUTOF10: 5
PAINLEVEL_OUTOF10: 7
PAINLEVEL_OUTOF10: 0
PAINLEVEL_OUTOF10: 8
PAINLEVEL_OUTOF10: 0
PAINLEVEL_OUTOF10: 6
PAINLEVEL_OUTOF10: 0
PAINLEVEL_OUTOF10: 0
PAINLEVEL_OUTOF10: 8

## 2025-08-20 ASSESSMENT — PAIN DESCRIPTION - PAIN TYPE
TYPE: SURGICAL PAIN

## 2025-08-20 ASSESSMENT — PAIN DESCRIPTION - FREQUENCY
FREQUENCY: CONTINUOUS

## 2025-08-20 ASSESSMENT — PAIN DESCRIPTION - DESCRIPTORS
DESCRIPTORS: ACHING;THROBBING
DESCRIPTORS: ACHING
DESCRIPTORS: ACHING;THROBBING
DESCRIPTORS: ACHING
DESCRIPTORS: ACHING;THROBBING
DESCRIPTORS: ACHING

## 2025-08-20 ASSESSMENT — PAIN DESCRIPTION - ONSET
ONSET: ON-GOING
ONSET: SUDDEN
ONSET: ON-GOING

## (undated) DEVICE — ADHESIVE SKIN CLOSURE WND 8.661X1.5 IN 22 CM LIQUIBAND SECUR

## (undated) DEVICE — APPLICATOR MEDICATED 26 CC SOLUTION HI LT ORNG CHLORAPREP

## (undated) DEVICE — Device

## (undated) DEVICE — KIT INT FIX FEM TIB CKPT MAKOPLASTY

## (undated) DEVICE — SUTURE VICRYL + SZ 2-0 L27IN ABSRB UD CT-2 L26MM 1/2 CIR TAPR VCP269H

## (undated) DEVICE — KIT DRP FOR RIO ROBOTIC ARM ASST SYS

## (undated) DEVICE — SOLUTION IRRIG 500ML 0.9% SOD CHLO USP POUR PLAS BTL

## (undated) DEVICE — BOOT POS LEG DEMAYO

## (undated) DEVICE — INTENDED FOR TISSUE SEPARATION, AND OTHER PROCEDURES THAT REQUIRE A SHARP SURGICAL BLADE TO PUNCTURE OR CUT.: Brand: BARD-PARKER ® CARBON RIB-BACK BLADES

## (undated) DEVICE — 3 BONE CEMENT MIXER: Brand: MIXEVAC

## (undated) DEVICE — ELECTRODE PT RET AD L9FT HI MOIST COND ADH HYDRGEL CORDED

## (undated) DEVICE — SOLUTION IRRIG 3000ML LAC R FLX CONT

## (undated) DEVICE — PENCIL SMK EVAC TELSCP 3 M TBNG

## (undated) DEVICE — CONCISE CEMENT SCULPS KIT: Brand: CONCISE

## (undated) DEVICE — GOWN SURG 2XL L49IN BLU FABRICREINFORCED SET IN SL HK LOOP

## (undated) DEVICE — BANDAGE COBAN 6 IN WND 6INX5YD FOAM

## (undated) DEVICE — CORD RETRCT SIL - ORDER MULTIPLES OF 10 EACH

## (undated) DEVICE — CUFF TRNQT 34IN SURG SGL BLDR SGL PRT PNEUMAT ELECTR AD W/

## (undated) DEVICE — SUTURE VICRYL + SZ 1 L36IN ABSRB UD L36MM CT-1 1/2 CIR VCP947H

## (undated) DEVICE — BLADE SURG SAW STD S STL OSC W/ SERR EDGE DISP

## (undated) DEVICE — KIT POS LEG DISP

## (undated) DEVICE — KIT TRK KNEE PROC VIZADISC

## (undated) DEVICE — CARTRIDGE SAW BLADE OSCILLATING TIP 19.5X1.28X105MM PRECISION FALCON

## (undated) DEVICE — DRESSING FOAM POST OPERATIVE 30X10 CM MEPLIX BORDER

## (undated) DEVICE — SOLUTION IV 1000 ML 0.9 NACL INJ USP EXCEL PLAS CONTAINER

## (undated) DEVICE — SUTURE MONOCRYL SZ 3-0 L27IN ABSRB UD L24MM PS-1 3/8 CIR PRIM Y936H

## (undated) DEVICE — GARMENT COMPR M FOR 13IN FT INTMIT SGL BLDR HEM FORC II

## (undated) DEVICE — SYRINGE MED 30ML STD CLR PLAS LUERLOCK TIP N CTRL DISP

## (undated) DEVICE — TUBING FLD MGMT Y DBL SPIK DUALWAVE

## (undated) DEVICE — NEEDLE SPNL 20GA L3.5IN YEL HUB S STL REG WALL FIT STYL

## (undated) DEVICE — ZIMMER® STERILE DISPOSABLE TOURNIQUET CUFF WITH PROTECTIVE SLEEVE AND PLC, SINGLE PORT, SINGLE BLADDER, 34 IN. (86 CM)

## (undated) DEVICE — NEEDLE SPNL 20GA L3 1 2IN YEL S STL POLYCARB HUB MTL STYL